# Patient Record
Sex: FEMALE | Race: BLACK OR AFRICAN AMERICAN | Employment: OTHER | URBAN - METROPOLITAN AREA
[De-identification: names, ages, dates, MRNs, and addresses within clinical notes are randomized per-mention and may not be internally consistent; named-entity substitution may affect disease eponyms.]

---

## 2021-03-18 ENCOUNTER — TRANSCRIBE ORDER (OUTPATIENT)
Dept: SCHEDULING | Age: 61
End: 2021-03-18

## 2021-03-18 DIAGNOSIS — R55 SYNCOPE AND COLLAPSE: Primary | ICD-10-CM

## 2021-04-07 ENCOUNTER — HOSPITAL ENCOUNTER (OUTPATIENT)
Dept: NON INVASIVE DIAGNOSTICS | Age: 61
Discharge: HOME | End: 2021-04-07
Payer: MEDICARE

## 2021-04-07 DIAGNOSIS — R55 SYNCOPE AND COLLAPSE: ICD-10-CM

## 2021-04-07 LAB
LEFT CCA DIST DIAS: 27.4 CM/S
LEFT CCA DIST SYS: 68.2 CM/S
LEFT CCA PROX DIAS: 24.7 CM/S
LEFT CCA PROX SYS: 115 CM/S
LEFT ECA DIAS: 12 CM/S
LEFT ECA SYS: 48.6 CM/S
LEFT ICA DIST DIAS: 23.1 CM/S
LEFT ICA DIST SYS: 66.1 CM/S
LEFT ICA PROX DIAS: 14.5 CM/S
LEFT ICA PROX SYS: 72.5 CM/S
LEFT SUBCLAVIAN DIAS: 0 CM/S
LEFT SUBCLAVIAN SYS: 83.2 CM/S
LEFT VERTEBRAL DIAS: 12.4 CM/S
LEFT VERTEBRAL SYS: 60.7 CM/S
RIGHT CCA DIST DIAS: 27.7 CM/S
RIGHT CCA DIST SYS: 55.6 CM/S
RIGHT CCA PROX DIAS: 21.5 CM/S
RIGHT CCA PROX SYS: 132 CM/S
RIGHT ECA DIAS: 7.91 CM/S
RIGHT ECA SYS: 46.1 CM/S
RIGHT ICA DIST DIAS: 18.6 CM/S
RIGHT ICA DIST SYS: 55.4 CM/S
RIGHT ICA PROX DIAS: 20.7 CM/S
RIGHT ICA PROX SYS: 41.4 CM/S
RIGHT SUBCLAVIAN DIAS: 0 CM/S
RIGHT SUBCLAVIAN SYS: 74.8 CM/S
RIGHT VERTEBRAL DIAS: 13.3 CM/S
RIGHT VERTEBRAL SYS: 73.6 CM/S

## 2021-04-07 PROCEDURE — 93880 EXTRACRANIAL BILAT STUDY: CPT

## 2023-02-03 ENCOUNTER — TRANSCRIBE ORDER (OUTPATIENT)
Dept: SCHEDULING | Age: 63
End: 2023-02-03

## 2023-02-03 DIAGNOSIS — Z12.31 ENCOUNTER FOR SCREENING MAMMOGRAM FOR MALIGNANT NEOPLASM OF BREAST: Primary | ICD-10-CM

## 2023-02-20 ENCOUNTER — HOSPITAL ENCOUNTER (OUTPATIENT)
Dept: MAMMOGRAPHY | Age: 63
Discharge: HOME | End: 2023-02-20
Payer: MEDICARE

## 2023-02-20 DIAGNOSIS — Z12.31 ENCOUNTER FOR SCREENING MAMMOGRAM FOR MALIGNANT NEOPLASM OF BREAST: ICD-10-CM

## 2023-02-20 PROCEDURE — 77063 BREAST TOMOSYNTHESIS BI: CPT

## 2023-06-26 ENCOUNTER — HOSPITAL ENCOUNTER (OUTPATIENT)
Facility: HOSPITAL | Age: 63
Discharge: HOME | End: 2023-06-29
Attending: STUDENT IN AN ORGANIZED HEALTH CARE EDUCATION/TRAINING PROGRAM
Payer: MEDICARE

## 2023-06-26 DIAGNOSIS — E04.9 GOITER: ICD-10-CM

## 2023-06-26 DIAGNOSIS — R94.6 ABNORMAL THYROID FUNCTION TEST: ICD-10-CM

## 2023-06-26 PROCEDURE — 76536 US EXAM OF HEAD AND NECK: CPT

## 2023-11-28 ENCOUNTER — OFFICE VISIT (OUTPATIENT)
Facility: CLINIC | Age: 63
End: 2023-11-28
Payer: MEDICARE

## 2023-11-28 VITALS
DIASTOLIC BLOOD PRESSURE: 68 MMHG | TEMPERATURE: 98.1 F | HEIGHT: 67 IN | SYSTOLIC BLOOD PRESSURE: 125 MMHG | RESPIRATION RATE: 18 BRPM | OXYGEN SATURATION: 96 % | WEIGHT: 187.4 LBS | HEART RATE: 91 BPM | BODY MASS INDEX: 29.41 KG/M2

## 2023-11-28 DIAGNOSIS — F32.A ANXIETY AND DEPRESSION: ICD-10-CM

## 2023-11-28 DIAGNOSIS — F41.9 ANXIETY AND DEPRESSION: ICD-10-CM

## 2023-11-28 DIAGNOSIS — J06.9 URI WITH COUGH AND CONGESTION: ICD-10-CM

## 2023-11-28 DIAGNOSIS — I10 PRIMARY HYPERTENSION: Primary | ICD-10-CM

## 2023-11-28 DIAGNOSIS — E55.9 VITAMIN D DEFICIENCY: ICD-10-CM

## 2023-11-28 DIAGNOSIS — E53.8 B12 DEFICIENCY: ICD-10-CM

## 2023-11-28 DIAGNOSIS — M05.79 RHEUMATOID ARTHRITIS INVOLVING MULTIPLE SITES WITH POSITIVE RHEUMATOID FACTOR (HCC): ICD-10-CM

## 2023-11-28 PROBLEM — G47.30 SLEEP APNEA: Status: ACTIVE | Noted: 2022-12-01

## 2023-11-28 PROBLEM — Z90.710 HISTORY OF HYSTERECTOMY: Status: ACTIVE | Noted: 2022-12-01

## 2023-11-28 PROBLEM — R63.2 BINGE EATING: Status: ACTIVE | Noted: 2022-12-22

## 2023-11-28 PROBLEM — E04.1 THYROID NODULE: Status: ACTIVE | Noted: 2023-11-28

## 2023-11-28 PROCEDURE — 3078F DIAST BP <80 MM HG: CPT

## 2023-11-28 PROCEDURE — G8419 CALC BMI OUT NRM PARAM NOF/U: HCPCS

## 2023-11-28 PROCEDURE — G8484 FLU IMMUNIZE NO ADMIN: HCPCS

## 2023-11-28 PROCEDURE — 3017F COLORECTAL CA SCREEN DOC REV: CPT

## 2023-11-28 PROCEDURE — 1036F TOBACCO NON-USER: CPT

## 2023-11-28 PROCEDURE — G8427 DOCREV CUR MEDS BY ELIG CLIN: HCPCS

## 2023-11-28 PROCEDURE — 99204 OFFICE O/P NEW MOD 45 MIN: CPT

## 2023-11-28 PROCEDURE — 3074F SYST BP LT 130 MM HG: CPT

## 2023-11-28 RX ORDER — FOLIC ACID 1 MG/1
1000 TABLET ORAL DAILY
COMMUNITY
Start: 2023-10-16

## 2023-11-28 RX ORDER — ETANERCEPT 25 MG
1 KIT SUBCUTANEOUS WEEKLY
COMMUNITY
Start: 2021-03-31

## 2023-11-28 RX ORDER — SERTRALINE HYDROCHLORIDE 100 MG/1
1 TABLET, FILM COATED ORAL DAILY
COMMUNITY
Start: 2023-10-29

## 2023-11-28 RX ORDER — AMLODIPINE BESYLATE 5 MG/1
1 TABLET ORAL DAILY
COMMUNITY

## 2023-11-28 RX ORDER — ALPRAZOLAM 0.5 MG/1
1 TABLET ORAL 2 TIMES DAILY PRN
COMMUNITY
Start: 2023-11-09

## 2023-11-28 RX ORDER — METHOTREXATE 2.5 MG/1
5 TABLET ORAL WEEKLY
COMMUNITY
Start: 2023-11-16

## 2023-11-28 RX ORDER — PHENOL 1.4 %
1 AEROSOL, SPRAY (ML) MUCOUS MEMBRANE DAILY PRN
COMMUNITY

## 2023-11-28 RX ORDER — HYDROCHLOROTHIAZIDE 12.5 MG/1
12.5 CAPSULE, GELATIN COATED ORAL DAILY
COMMUNITY
Start: 2023-10-04

## 2023-11-28 SDOH — ECONOMIC STABILITY: FOOD INSECURITY: WITHIN THE PAST 12 MONTHS, YOU WORRIED THAT YOUR FOOD WOULD RUN OUT BEFORE YOU GOT MONEY TO BUY MORE.: SOMETIMES TRUE

## 2023-11-28 SDOH — ECONOMIC STABILITY: FOOD INSECURITY: WITHIN THE PAST 12 MONTHS, THE FOOD YOU BOUGHT JUST DIDN'T LAST AND YOU DIDN'T HAVE MONEY TO GET MORE.: SOMETIMES TRUE

## 2023-11-28 SDOH — ECONOMIC STABILITY: INCOME INSECURITY: HOW HARD IS IT FOR YOU TO PAY FOR THE VERY BASICS LIKE FOOD, HOUSING, MEDICAL CARE, AND HEATING?: NOT VERY HARD

## 2023-11-28 SDOH — ECONOMIC STABILITY: HOUSING INSECURITY
IN THE LAST 12 MONTHS, WAS THERE A TIME WHEN YOU DID NOT HAVE A STEADY PLACE TO SLEEP OR SLEPT IN A SHELTER (INCLUDING NOW)?: NO

## 2023-11-28 ASSESSMENT — ENCOUNTER SYMPTOMS
COUGH: 1
SINUS PRESSURE: 0
GASTROINTESTINAL NEGATIVE: 1
TROUBLE SWALLOWING: 0
EYES NEGATIVE: 1
APNEA: 0
SORE THROAT: 0
CHEST TIGHTNESS: 0
VOICE CHANGE: 0
WHEEZING: 0
CHOKING: 0
SINUS PAIN: 0
SHORTNESS OF BREATH: 0

## 2023-11-28 ASSESSMENT — PATIENT HEALTH QUESTIONNAIRE - PHQ9
SUM OF ALL RESPONSES TO PHQ QUESTIONS 1-9: 0
2. FEELING DOWN, DEPRESSED OR HOPELESS: 0
SUM OF ALL RESPONSES TO PHQ QUESTIONS 1-9: 0
SUM OF ALL RESPONSES TO PHQ9 QUESTIONS 1 & 2: 0
1. LITTLE INTEREST OR PLEASURE IN DOING THINGS: 0
SUM OF ALL RESPONSES TO PHQ QUESTIONS 1-9: 0
SUM OF ALL RESPONSES TO PHQ QUESTIONS 1-9: 0

## 2023-11-28 NOTE — PROGRESS NOTES
Daniel Mejia is a 58 y.o. female who presents to the office today for the following:    Chief Complaint   Patient presents with    New Patient    Cough     C/O  coughing, mostly in morning and at night     Weight Management     Want to loose weight        Past Medical History:   Diagnosis Date    Hypertension     Rheumatoid arthritis ([address])     Spinal stenosis         Past Surgical History:   Procedure Laterality Date    HYSTERECTOMY (CERVIX STATUS UNKNOWN)          Family History   Problem Relation Age of Onset    High Blood Pressure Other     Diabetes Other         Social History     Tobacco Use    Smoking status: Never    Smokeless tobacco: Never   Vaping Use    Vaping Use: Never used   Substance Use Topics    Alcohol use: Never    Drug use: Never        HPI  Patient here to establish as new patient provider with [facility] hypertension, obesity, anxiety, depression, rheumatoid arthritis vitamin D deficiency. Last mammogram 2/20/2023. Hysterectomy. Had last colonoscopy at approx 5 years ago by Ebenezer Jerez with Dr. Aram Marino, [place] [facility] for thyroid nodules. Low suspicion for malignancy therefore scheduled 6/20/2024 for repeat ultrasound for close monitoring. Per last note the nodules appear to be growing and biopsy will be performed at that time. Also follows with [facility]. Patient would like to discuss weight loss and dietary options. Patient has had congested cough, postnasal drip for 1 week, worse at night however it does appear to be improving. Denies chest pain, shortness of breath, dizziness, palpitations or near syncope. Denies wheezing, fever, body aches, chills, nasal congestion, sore throat nausea vomiting or diarrhea. Patient endorses bilateral muffled hearing for several months worse on the left. Denies ear pain or drainage. Denies known injury. States that she has difficulty hearing what others are saying around her when spoken to low-frequency's.   Is

## 2023-11-29 LAB
25(OH)D3+25(OH)D2 SERPL-MCNC: 48.9 NG/ML (ref 30–100)
ALBUMIN SERPL-MCNC: 4.5 G/DL (ref 3.9–4.9)
ALBUMIN/GLOB SERPL: 1.3 {RATIO} (ref 1.2–2.2)
ALP SERPL-CCNC: 87 IU/L (ref 44–121)
ALT SERPL-CCNC: 19 IU/L (ref 0–32)
APPEARANCE UR: CLEAR
AST SERPL-CCNC: 27 IU/L (ref 0–40)
BACTERIA #/AREA URNS HPF: NORMAL /[HPF]
BASOPHILS # BLD AUTO: 0.1 X10E3/UL (ref 0–0.2)
BASOPHILS NFR BLD AUTO: 1 %
BILIRUB SERPL-MCNC: 0.6 MG/DL (ref 0–1.2)
BILIRUB UR QL STRIP: NEGATIVE
BUN SERPL-MCNC: 18 MG/DL (ref 8–27)
BUN/CREAT SERPL: 19 (ref 12–28)
CALCIUM SERPL-MCNC: 9.8 MG/DL (ref 8.7–10.3)
CASTS URNS QL MICRO: NORMAL /LPF
CHLORIDE SERPL-SCNC: 102 MMOL/L (ref 96–106)
CHOLEST SERPL-MCNC: 230 MG/DL (ref 100–199)
CO2 SERPL-SCNC: 23 MMOL/L (ref 20–29)
COLOR UR: YELLOW
CREAT SERPL-MCNC: 0.96 MG/DL (ref 0.57–1)
EGFRCR SERPLBLD CKD-EPI 2021: 66 ML/MIN/1.73
EOSINOPHIL # BLD AUTO: 0.1 X10E3/UL (ref 0–0.4)
EOSINOPHIL NFR BLD AUTO: 1 %
EPI CELLS #/AREA URNS HPF: NORMAL /HPF (ref 0–10)
ERYTHROCYTE [DISTWIDTH] IN BLOOD BY AUTOMATED COUNT: 12.3 % (ref 11.7–15.4)
FOLATE SERPL-MCNC: >20 NG/ML
GLOBULIN SER CALC-MCNC: 3.4 G/DL (ref 1.5–4.5)
GLUCOSE SERPL-MCNC: 84 MG/DL (ref 70–99)
GLUCOSE UR QL STRIP: NEGATIVE
HCT VFR BLD AUTO: 38.8 % (ref 34–46.6)
HDLC SERPL-MCNC: 109 MG/DL
HGB BLD-MCNC: 12.7 G/DL (ref 11.1–15.9)
HGB UR QL STRIP: NEGATIVE
IMM GRANULOCYTES # BLD AUTO: 0 X10E3/UL (ref 0–0.1)
IMM GRANULOCYTES NFR BLD AUTO: 0 %
KETONES UR QL STRIP: NEGATIVE
LDLC SERPL CALC-MCNC: 106 MG/DL (ref 0–99)
LEUKOCYTE ESTERASE UR QL STRIP: NEGATIVE
LYMPHOCYTES # BLD AUTO: 2 X10E3/UL (ref 0.7–3.1)
LYMPHOCYTES NFR BLD AUTO: 40 %
MCH RBC QN AUTO: 31.1 PG (ref 26.6–33)
MCHC RBC AUTO-ENTMCNC: 32.7 G/DL (ref 31.5–35.7)
MCV RBC AUTO: 95 FL (ref 79–97)
MICRO URNS: NORMAL
MICRO URNS: NORMAL
MONOCYTES # BLD AUTO: 0.5 X10E3/UL (ref 0.1–0.9)
MONOCYTES NFR BLD AUTO: 10 %
NEUTROPHILS # BLD AUTO: 2.4 X10E3/UL (ref 1.4–7)
NEUTROPHILS NFR BLD AUTO: 48 %
NITRITE UR QL STRIP: NEGATIVE
PH UR STRIP: 7 [PH] (ref 5–7.5)
PLATELET # BLD AUTO: 214 X10E3/UL (ref 150–450)
POTASSIUM SERPL-SCNC: 4.2 MMOL/L (ref 3.5–5.2)
PROT SERPL-MCNC: 7.9 G/DL (ref 6–8.5)
PROT UR QL STRIP: NEGATIVE
RBC # BLD AUTO: 4.08 X10E6/UL (ref 3.77–5.28)
RBC #/AREA URNS HPF: NORMAL /HPF (ref 0–2)
SODIUM SERPL-SCNC: 141 MMOL/L (ref 134–144)
SP GR UR STRIP: 1.01 (ref 1–1.03)
T4 FREE SERPL-MCNC: 1.11 NG/DL (ref 0.82–1.77)
TRIGL SERPL-MCNC: 89 MG/DL (ref 0–149)
TSH SERPL DL<=0.005 MIU/L-ACNC: 1.57 UIU/ML (ref 0.45–4.5)
UROBILINOGEN UR STRIP-MCNC: 0.2 MG/DL (ref 0.2–1)
VIT B12 SERPL-MCNC: 1527 PG/ML (ref 232–1245)
VLDLC SERPL CALC-MCNC: 15 MG/DL (ref 5–40)
WBC # BLD AUTO: 5 X10E3/UL (ref 3.4–10.8)
WBC #/AREA URNS HPF: NORMAL /HPF (ref 0–5)

## 2023-12-07 ENCOUNTER — NURSE ONLY (OUTPATIENT)
Facility: CLINIC | Age: 63
End: 2023-12-07
Payer: MEDICARE

## 2023-12-07 DIAGNOSIS — H91.93 BILATERAL HEARING LOSS, UNSPECIFIED HEARING LOSS TYPE: Primary | ICD-10-CM

## 2023-12-07 PROCEDURE — 69210 REMOVE IMPACTED EAR WAX UNI: CPT

## 2024-01-03 DIAGNOSIS — H91.93 BILATERAL HEARING LOSS, UNSPECIFIED HEARING LOSS TYPE: Primary | ICD-10-CM

## 2024-01-03 NOTE — PROGRESS NOTES
Patient requesting referral for bilateral hearing loss. Referral issued. Will have staff reach out to patient and notify.

## 2024-01-12 ENCOUNTER — HOSPITAL ENCOUNTER (EMERGENCY)
Facility: HOSPITAL | Age: 64
Discharge: HOME | End: 2024-01-12
Attending: EMERGENCY MEDICINE
Payer: MEDICARE

## 2024-01-12 ENCOUNTER — APPOINTMENT (OUTPATIENT)
Facility: HOSPITAL | Age: 64
End: 2024-01-12
Payer: MEDICARE

## 2024-01-12 VITALS
HEART RATE: 75 BPM | DIASTOLIC BLOOD PRESSURE: 76 MMHG | RESPIRATION RATE: 18 BRPM | OXYGEN SATURATION: 100 % | HEIGHT: 67 IN | WEIGHT: 175 LBS | BODY MASS INDEX: 27.47 KG/M2 | SYSTOLIC BLOOD PRESSURE: 123 MMHG | TEMPERATURE: 97.8 F

## 2024-01-12 DIAGNOSIS — K62.5 RECTAL BLEEDING: Primary | ICD-10-CM

## 2024-01-12 DIAGNOSIS — K64.1 GRADE II HEMORRHOIDS: ICD-10-CM

## 2024-01-12 DIAGNOSIS — R82.71 BACTERIURIA: ICD-10-CM

## 2024-01-12 DIAGNOSIS — R31.9 HEMATURIA, UNSPECIFIED TYPE: ICD-10-CM

## 2024-01-12 DIAGNOSIS — S39.012A BACK STRAIN, INITIAL ENCOUNTER: ICD-10-CM

## 2024-01-12 LAB
ALBUMIN SERPL-MCNC: 3.5 G/DL (ref 3.5–5)
ALBUMIN/GLOB SERPL: 0.8 (ref 1.1–2.2)
ALP SERPL-CCNC: 61 U/L (ref 45–117)
ALT SERPL-CCNC: 21 U/L (ref 12–78)
ANION GAP SERPL CALC-SCNC: 8 MMOL/L (ref 5–15)
APPEARANCE UR: CLEAR
AST SERPL W P-5'-P-CCNC: 21 U/L (ref 15–37)
BACTERIA URNS QL MICRO: ABNORMAL /HPF
BASOPHILS # BLD: 0 K/UL (ref 0–0.1)
BASOPHILS NFR BLD: 0 % (ref 0–1)
BILIRUB SERPL-MCNC: 0.6 MG/DL (ref 0.2–1)
BILIRUB UR QL: NEGATIVE
BUN SERPL-MCNC: 15 MG/DL (ref 6–20)
BUN/CREAT SERPL: 15 (ref 12–20)
CA-I BLD-MCNC: 9.1 MG/DL (ref 8.5–10.1)
CHLORIDE SERPL-SCNC: 105 MMOL/L (ref 97–108)
CO2 SERPL-SCNC: 28 MMOL/L (ref 21–32)
COLOR UR: ABNORMAL
CREAT SERPL-MCNC: 1.02 MG/DL (ref 0.55–1.02)
DIFFERENTIAL METHOD BLD: ABNORMAL
EOSINOPHIL # BLD: 0 K/UL (ref 0–0.4)
EOSINOPHIL NFR BLD: 1 % (ref 0–7)
ERYTHROCYTE [DISTWIDTH] IN BLOOD BY AUTOMATED COUNT: 12.3 % (ref 11.5–14.5)
GLOBULIN SER CALC-MCNC: 4.3 G/DL (ref 2–4)
GLUCOSE SERPL-MCNC: 92 MG/DL (ref 65–100)
GLUCOSE UR STRIP.AUTO-MCNC: NEGATIVE MG/DL
HCT VFR BLD AUTO: 35.1 % (ref 35–47)
HEMOCCULT SP1 STL QL: NEGATIVE
HGB BLD-MCNC: 11.6 G/DL (ref 11.5–16)
HGB UR QL STRIP: NEGATIVE
IMM GRANULOCYTES # BLD AUTO: 0 K/UL
IMM GRANULOCYTES NFR BLD AUTO: 0 %
INR PPP: 1 (ref 0.9–1.1)
KETONES UR QL STRIP.AUTO: NEGATIVE MG/DL
LEUKOCYTE ESTERASE UR QL STRIP.AUTO: ABNORMAL
LYMPHOCYTES # BLD: 1.9 K/UL (ref 0.8–3.5)
LYMPHOCYTES NFR BLD: 53 % (ref 12–49)
MCH RBC QN AUTO: 31.4 PG (ref 26–34)
MCHC RBC AUTO-ENTMCNC: 33 G/DL (ref 30–36.5)
MCV RBC AUTO: 95.1 FL (ref 80–99)
MONOCYTES # BLD: 0.4 K/UL (ref 0–1)
MONOCYTES NFR BLD: 11 % (ref 5–13)
NEUTS SEG # BLD: 1.3 K/UL (ref 1.8–8)
NEUTS SEG NFR BLD: 35 % (ref 32–75)
NITRITE UR QL STRIP.AUTO: NEGATIVE
NRBC # BLD: 0 K/UL (ref 0–0.01)
NRBC BLD-RTO: 0 PER 100 WBC
PH UR STRIP: 7.5 (ref 5–8)
PLATELET # BLD AUTO: 145 K/UL (ref 150–400)
PMV BLD AUTO: 9.9 FL (ref 8.9–12.9)
POTASSIUM SERPL-SCNC: 3.4 MMOL/L (ref 3.5–5.1)
PROT SERPL-MCNC: 7.8 G/DL (ref 6.4–8.2)
PROT UR STRIP-MCNC: NEGATIVE MG/DL
PROTHROMBIN TIME: 13.1 SEC (ref 11.9–14.6)
RBC # BLD AUTO: 3.69 M/UL (ref 3.8–5.2)
RBC #/AREA URNS HPF: ABNORMAL /HPF (ref 0–3)
RBC MORPH BLD: ABNORMAL
SODIUM SERPL-SCNC: 141 MMOL/L (ref 136–145)
SP GR UR REFRACTOMETRY: 1.01 (ref 1–1.03)
UROBILINOGEN UR QL STRIP.AUTO: 0.2 EU/DL (ref 0.2–1)
WBC # BLD AUTO: 3.6 K/UL (ref 3.6–11)
WBC URNS QL MICRO: ABNORMAL /HPF (ref 0–5)

## 2024-01-12 PROCEDURE — 96374 THER/PROPH/DIAG INJ IV PUSH: CPT

## 2024-01-12 PROCEDURE — 82272 OCCULT BLD FECES 1-3 TESTS: CPT

## 2024-01-12 PROCEDURE — 74177 CT ABD & PELVIS W/CONTRAST: CPT

## 2024-01-12 PROCEDURE — 6360000002 HC RX W HCPCS: Performed by: EMERGENCY MEDICINE

## 2024-01-12 PROCEDURE — 85610 PROTHROMBIN TIME: CPT

## 2024-01-12 PROCEDURE — 36415 COLL VENOUS BLD VENIPUNCTURE: CPT

## 2024-01-12 PROCEDURE — 99285 EMERGENCY DEPT VISIT HI MDM: CPT

## 2024-01-12 PROCEDURE — 80053 COMPREHEN METABOLIC PANEL: CPT

## 2024-01-12 PROCEDURE — 81001 URINALYSIS AUTO W/SCOPE: CPT

## 2024-01-12 PROCEDURE — 85025 COMPLETE CBC W/AUTO DIFF WBC: CPT

## 2024-01-12 PROCEDURE — 6360000004 HC RX CONTRAST MEDICATION: Performed by: EMERGENCY MEDICINE

## 2024-01-12 RX ORDER — CYCLOBENZAPRINE HCL 5 MG
5-10 TABLET ORAL 3 TIMES DAILY PRN
Qty: 21 TABLET | Refills: 0 | Status: SHIPPED | OUTPATIENT
Start: 2024-01-12 | End: 2024-01-19

## 2024-01-12 RX ORDER — OMEPRAZOLE 20 MG/1
40 CAPSULE, DELAYED RELEASE ORAL DAILY
Qty: 60 CAPSULE | Refills: 0 | Status: SHIPPED | OUTPATIENT
Start: 2024-01-12 | End: 2024-02-11

## 2024-01-12 RX ORDER — ONDANSETRON 4 MG/1
4 TABLET, ORALLY DISINTEGRATING ORAL EVERY 8 HOURS PRN
Qty: 20 TABLET | Refills: 0 | Status: SHIPPED | OUTPATIENT
Start: 2024-01-12 | End: 2024-01-19

## 2024-01-12 RX ORDER — IBUPROFEN 600 MG/1
600 TABLET ORAL EVERY 6 HOURS PRN
Qty: 28 TABLET | Refills: 0 | Status: SHIPPED | OUTPATIENT
Start: 2024-01-12 | End: 2024-01-19

## 2024-01-12 RX ORDER — CEPHALEXIN 500 MG/1
500 CAPSULE ORAL 4 TIMES DAILY
Qty: 28 CAPSULE | Refills: 0 | Status: SHIPPED | OUTPATIENT
Start: 2024-01-12 | End: 2024-01-19

## 2024-01-12 RX ORDER — ACETAMINOPHEN 500 MG
500 TABLET ORAL EVERY 6 HOURS PRN
Qty: 28 TABLET | Refills: 0 | Status: SHIPPED | OUTPATIENT
Start: 2024-01-12 | End: 2024-01-19

## 2024-01-12 RX ORDER — MORPHINE SULFATE 4 MG/ML
4 INJECTION, SOLUTION INTRAMUSCULAR; INTRAVENOUS
Status: COMPLETED | OUTPATIENT
Start: 2024-01-12 | End: 2024-01-12

## 2024-01-12 RX ORDER — LIDOCAINE 50 MG/G
1 PATCH TOPICAL DAILY
Qty: 10 PATCH | Refills: 0 | Status: SHIPPED | OUTPATIENT
Start: 2024-01-12 | End: 2024-01-22

## 2024-01-12 RX ADMIN — IOPAMIDOL 100 ML: 755 INJECTION, SOLUTION INTRAVENOUS at 11:18

## 2024-01-12 RX ADMIN — MORPHINE SULFATE 4 MG: 4 INJECTION, SOLUTION INTRAMUSCULAR; INTRAVENOUS at 09:31

## 2024-01-12 ASSESSMENT — PAIN DESCRIPTION - LOCATION: LOCATION: BACK

## 2024-01-12 ASSESSMENT — PAIN DESCRIPTION - ORIENTATION: ORIENTATION: LOWER

## 2024-01-12 ASSESSMENT — PAIN DESCRIPTION - PAIN TYPE: TYPE: ACUTE PAIN

## 2024-01-12 ASSESSMENT — PAIN SCALES - GENERAL: PAINLEVEL_OUTOF10: 6

## 2024-01-12 NOTE — ED TRIAGE NOTES
Pt reports hematuria and moderate amount of bright red blood in her stool since Sunday. PT reports low back pain 8/10. Pt denies hx of GI bleed.

## 2024-01-12 NOTE — ED PROVIDER NOTES
calculato...       Collection Time Result Time CALCIUM BILI TOTAL AST ALT Alk Phosphatase   01/12/24 09:03:00 01/12/24 09:36:23 9.1 0.6 21 21 61       Collection Time Result Time PROTEIN ALBUMIN Globulin Albumin/Globulin Ratio   01/12/24 09:03:00 01/12/24 09:36:23 7.8 3.5 4.3 High  0.8 Low          Final result                          Protime-INR (Final result)   Component (Lab Inquiry)  Collection Time Result Time Protime INR   01/12/24 09:03:00 01/12/24 09:33:45 13.1 1.0         Final result              Radiologic Studies:  Non-plain film images such as CT, Ultrasound and MRI are read by the radiologist. Plain radiographic images are visualized and preliminarily interpreted by the ED Provider with the following findings: Not Applicable.    Interpretation per the Radiologist below, if available at the time of this note:  CT ABDOMEN PELVIS W IV CONTRAST Additional Contrast? Radiologist Recommendation   Final Result      1. No acute abnormality.           EMERGENCY DEPARTMENT COURSE and DIFFERENTIAL DIAGNOSIS/MDM   CC/HPI Summary, DDx, ED Course, and Reassessment: pt arrives to ed with concerns for vaginal or rectal bleeding. No evidnce of blood on exam. Began complaining of lower abdominal and lower back pain. Follow up with GI recommended. Hgb stable, return precautions outlined.        Records Reviewed (source and summary of external notes): Prior medical records and Nursing notes    Vitals:    Vitals:    01/12/24 0859   BP: 123/76   Pulse: 75   Resp: 18   Temp: 97.8 °F (36.6 °C)   SpO2: 100%   Weight: 79.4 kg (175 lb)   Height: 1.702 m (5' 7\")        ED COURSE  ED Course as of 01/14/24 0913 Fri Jan 12, 2024   1012 Scant blood in rectal vault, heme negative.  [MC]      ED Course User Index  [MC] Zaheer Otero MD        Patient was given the following medications:  Medications   morphine sulfate (PF) injection 4 mg (4 mg IntraVENous Given 1/12/24 0931)   iopamidol (ISOVUE-370) 76 % injection 100 mL

## 2024-01-17 ENCOUNTER — OFFICE VISIT (OUTPATIENT)
Facility: CLINIC | Age: 64
End: 2024-01-17
Payer: MEDICARE

## 2024-01-17 VITALS
RESPIRATION RATE: 18 BRPM | WEIGHT: 174.25 LBS | HEIGHT: 67 IN | OXYGEN SATURATION: 98 % | TEMPERATURE: 97.4 F | SYSTOLIC BLOOD PRESSURE: 95 MMHG | DIASTOLIC BLOOD PRESSURE: 59 MMHG | BODY MASS INDEX: 27.35 KG/M2 | HEART RATE: 90 BPM

## 2024-01-17 DIAGNOSIS — H91.93 BILATERAL HEARING LOSS, UNSPECIFIED HEARING LOSS TYPE: Primary | ICD-10-CM

## 2024-01-17 DIAGNOSIS — N30.01 ACUTE CYSTITIS WITH HEMATURIA: ICD-10-CM

## 2024-01-17 DIAGNOSIS — K62.5 RECTAL BLEEDING: ICD-10-CM

## 2024-01-17 DIAGNOSIS — I95.9 HYPOTENSION, UNSPECIFIED HYPOTENSION TYPE: ICD-10-CM

## 2024-01-17 PROCEDURE — 3017F COLORECTAL CA SCREEN DOC REV: CPT

## 2024-01-17 PROCEDURE — G8419 CALC BMI OUT NRM PARAM NOF/U: HCPCS

## 2024-01-17 PROCEDURE — G8484 FLU IMMUNIZE NO ADMIN: HCPCS

## 2024-01-17 PROCEDURE — 1036F TOBACCO NON-USER: CPT

## 2024-01-17 PROCEDURE — G8427 DOCREV CUR MEDS BY ELIG CLIN: HCPCS

## 2024-01-17 PROCEDURE — 99214 OFFICE O/P EST MOD 30 MIN: CPT

## 2024-01-17 RX ORDER — NITROFURANTOIN 25; 75 MG/1; MG/1
100 CAPSULE ORAL 2 TIMES DAILY
Qty: 10 CAPSULE | Refills: 0 | Status: SHIPPED | OUTPATIENT
Start: 2024-01-17 | End: 2024-01-22

## 2024-01-17 ASSESSMENT — PATIENT HEALTH QUESTIONNAIRE - PHQ9
SUM OF ALL RESPONSES TO PHQ9 QUESTIONS 1 & 2: 0
SUM OF ALL RESPONSES TO PHQ QUESTIONS 1-9: 0
1. LITTLE INTEREST OR PLEASURE IN DOING THINGS: 0
SUM OF ALL RESPONSES TO PHQ QUESTIONS 1-9: 0
SUM OF ALL RESPONSES TO PHQ QUESTIONS 1-9: 0
2. FEELING DOWN, DEPRESSED OR HOPELESS: 0
SUM OF ALL RESPONSES TO PHQ QUESTIONS 1-9: 0

## 2024-01-17 NOTE — PROGRESS NOTES
Chief Complaint   Patient presents with    Follow-up     F/U ED visit for rectal bleeding on 01/12/2024 and back pain.   was given medication for things that she did not know the reason for so she did not take all of the meds Rx'd for her.    Rectal Bleeding     States continues with rectal bleeding, bright red.  States had problem before and it stopped after a day or 2.  For the past 2 weeks, she continues with the bleeding.    Dizziness     States feels dizzy at times.     1. Have you been to the ER, urgent care clinic since your last visit?  Hospitalized since your last visit?Yes When: 01/12/2024 [facility] ED.    2. Have you seen or consulted any other health care providers outside of the [facility] since your last visit?  Include any pap smears or colon screening. No    
Audiology  New referral sent to audiology.      2. Rectal bleeding  -     CBC with Auto Differential  -     Comprehensive Metabolic Panel  -     [facility] -  Trent Delgadillo Jr., MD, Gastroenterology, [facility] in ER negative.  Denies rectal bleeding over the past 2 days.  Will check labs now for stability.  Referral sent to Dr. Delgadillo.  Contact for self scheduling provided.    3.  Hypotension, unspecified hypotension type  BP is borderline at 95/59 and 85/55 in office today.  Patient denies chest pain shortness of breath dizziness palpitations or near syncope today however has reported some dizziness over the past week.  Labs stable in ER however we will go ahead and repeat now.  Discussed with patient would like her to hold HCTZ 12.5 mg daily until after return visit next week.  Avoid driving or operating machinery and presence of dizziness to reduce risk of injury to self or others.  Slow position changes.  Address hypertension can increase risk for falls.  1 week follow-up in office.    4. Acute cystitis with hematuria  -     nitrofurantoin, macrocrystal-monohydrate, (MACROBID) 100 MG capsule; Take 1 capsule by mouth 2 times daily for 5 days, Disp-10 capsule, R-0Normal  -     Urinalysis with Microscopic  -     Culture, Urine  UA positive in ER for bacteria and leukocytes.  Due to persistent low back pain and dysuria will treat for UTI now.  Treat with Macrobid twice daily for 5 days.  Take as directed.  Will send culture now.  May also take Azo as directed for up to 24 hours.  Increase water intake.  Tylenol as needed for pain as directed.  Contact provider if no improvement in 2 to 3 days or worsening symptoms.    We discussed the expected course, resolution and complications of the diagnosis(es) in detail.  Medication risks, benefits, costs, interactions, and alternatives were discussed as indicated.  I advised her to contact the office if her condition worsens, changes or fails to improve as

## 2024-01-18 LAB
ALBUMIN SERPL-MCNC: 4.5 G/DL (ref 3.9–4.9)
ALBUMIN/GLOB SERPL: 1.5 {RATIO} (ref 1.2–2.2)
ALP SERPL-CCNC: 67 IU/L (ref 44–121)
ALT SERPL-CCNC: 15 IU/L (ref 0–32)
APPEARANCE UR: CLEAR
AST SERPL-CCNC: 26 IU/L (ref 0–40)
BACTERIA #/AREA URNS HPF: NORMAL /[HPF]
BASOPHILS # BLD AUTO: 0 X10E3/UL (ref 0–0.2)
BASOPHILS NFR BLD AUTO: 1 %
BILIRUB SERPL-MCNC: 0.7 MG/DL (ref 0–1.2)
BILIRUB UR QL STRIP: NEGATIVE
BUN SERPL-MCNC: 16 MG/DL (ref 8–27)
BUN/CREAT SERPL: 19 (ref 12–28)
CALCIUM SERPL-MCNC: 9.9 MG/DL (ref 8.7–10.3)
CASTS URNS QL MICRO: NORMAL /LPF
CHLORIDE SERPL-SCNC: 103 MMOL/L (ref 96–106)
CO2 SERPL-SCNC: 25 MMOL/L (ref 20–29)
COLOR UR: YELLOW
CREAT SERPL-MCNC: 0.86 MG/DL (ref 0.57–1)
EGFRCR SERPLBLD CKD-EPI 2021: 75 ML/MIN/1.73
EOSINOPHIL # BLD AUTO: 0 X10E3/UL (ref 0–0.4)
EOSINOPHIL NFR BLD AUTO: 1 %
EPI CELLS #/AREA URNS HPF: NORMAL /HPF (ref 0–10)
ERYTHROCYTE [DISTWIDTH] IN BLOOD BY AUTOMATED COUNT: 12.7 % (ref 11.7–15.4)
GLOBULIN SER CALC-MCNC: 3 G/DL (ref 1.5–4.5)
GLUCOSE SERPL-MCNC: 86 MG/DL (ref 70–99)
GLUCOSE UR QL STRIP: NEGATIVE
HCT VFR BLD AUTO: 34 % (ref 34–46.6)
HGB BLD-MCNC: 11.2 G/DL (ref 11.1–15.9)
HGB UR QL STRIP: NEGATIVE
IMM GRANULOCYTES # BLD AUTO: 0 X10E3/UL (ref 0–0.1)
IMM GRANULOCYTES NFR BLD AUTO: 0 %
KETONES UR QL STRIP: NEGATIVE
LEUKOCYTE ESTERASE UR QL STRIP: NEGATIVE
LYMPHOCYTES # BLD AUTO: 2.3 X10E3/UL (ref 0.7–3.1)
LYMPHOCYTES NFR BLD AUTO: 46 %
MCH RBC QN AUTO: 31 PG (ref 26.6–33)
MCHC RBC AUTO-ENTMCNC: 32.9 G/DL (ref 31.5–35.7)
MCV RBC AUTO: 94 FL (ref 79–97)
MICRO URNS: NORMAL
MICRO URNS: NORMAL
MONOCYTES # BLD AUTO: 0.4 X10E3/UL (ref 0.1–0.9)
MONOCYTES NFR BLD AUTO: 8 %
NEUTROPHILS # BLD AUTO: 2.3 X10E3/UL (ref 1.4–7)
NEUTROPHILS NFR BLD AUTO: 44 %
NITRITE UR QL STRIP: NEGATIVE
PH UR STRIP: 6 [PH] (ref 5–7.5)
PLATELET # BLD AUTO: 226 X10E3/UL (ref 150–450)
POTASSIUM SERPL-SCNC: 3.9 MMOL/L (ref 3.5–5.2)
PROT SERPL-MCNC: 7.5 G/DL (ref 6–8.5)
PROT UR QL STRIP: NEGATIVE
RBC # BLD AUTO: 3.61 X10E6/UL (ref 3.77–5.28)
RBC #/AREA URNS HPF: NORMAL /HPF (ref 0–2)
SODIUM SERPL-SCNC: 139 MMOL/L (ref 134–144)
SP GR UR STRIP: 1 (ref 1–1.03)
UROBILINOGEN UR STRIP-MCNC: 0.2 MG/DL (ref 0.2–1)
WBC # BLD AUTO: 5.1 X10E3/UL (ref 3.4–10.8)
WBC #/AREA URNS HPF: NORMAL /HPF (ref 0–5)

## 2024-01-19 LAB — BACTERIA UR CULT: NO GROWTH

## 2024-01-22 ENCOUNTER — TELEPHONE (OUTPATIENT)
Facility: CLINIC | Age: 64
End: 2024-01-22

## 2024-01-22 NOTE — TELEPHONE ENCOUNTER
Spoke with patient re: lab results and her requesting another GI referral which was forwarded to KENNETH Baldwin NP.

## 2024-01-23 ENCOUNTER — OFFICE VISIT (OUTPATIENT)
Facility: CLINIC | Age: 64
End: 2024-01-23
Payer: MEDICARE

## 2024-01-23 VITALS
OXYGEN SATURATION: 100 % | RESPIRATION RATE: 18 BRPM | BODY MASS INDEX: 27.86 KG/M2 | SYSTOLIC BLOOD PRESSURE: 94 MMHG | TEMPERATURE: 97.4 F | HEIGHT: 67 IN | DIASTOLIC BLOOD PRESSURE: 52 MMHG | HEART RATE: 84 BPM | WEIGHT: 177.5 LBS

## 2024-01-23 DIAGNOSIS — K62.5 RECTAL BLEEDING: Primary | ICD-10-CM

## 2024-01-23 DIAGNOSIS — I95.9 HYPOTENSION, UNSPECIFIED HYPOTENSION TYPE: ICD-10-CM

## 2024-01-23 PROCEDURE — 99213 OFFICE O/P EST LOW 20 MIN: CPT

## 2024-01-23 PROCEDURE — G8484 FLU IMMUNIZE NO ADMIN: HCPCS

## 2024-01-23 PROCEDURE — G8427 DOCREV CUR MEDS BY ELIG CLIN: HCPCS

## 2024-01-23 PROCEDURE — G8419 CALC BMI OUT NRM PARAM NOF/U: HCPCS

## 2024-01-23 PROCEDURE — 3017F COLORECTAL CA SCREEN DOC REV: CPT

## 2024-01-23 PROCEDURE — 1036F TOBACCO NON-USER: CPT

## 2024-01-23 ASSESSMENT — PATIENT HEALTH QUESTIONNAIRE - PHQ9
SUM OF ALL RESPONSES TO PHQ QUESTIONS 1-9: 1
SUM OF ALL RESPONSES TO PHQ QUESTIONS 1-9: 1
1. LITTLE INTEREST OR PLEASURE IN DOING THINGS: 0
2. FEELING DOWN, DEPRESSED OR HOPELESS: 1
SUM OF ALL RESPONSES TO PHQ QUESTIONS 1-9: 1
SUM OF ALL RESPONSES TO PHQ QUESTIONS 1-9: 1
SUM OF ALL RESPONSES TO PHQ9 QUESTIONS 1 & 2: 1

## 2024-01-23 ASSESSMENT — ENCOUNTER SYMPTOMS
GASTROINTESTINAL NEGATIVE: 1
RESPIRATORY NEGATIVE: 1
EYES NEGATIVE: 1

## 2024-01-23 NOTE — PROGRESS NOTES
Acacia Jarquin is a 63 y.o. female who presents to the office today for the following:    Chief Complaint   Patient presents with    Follow-up     Hypotension.    Rectal Bleeding     Patient states she has not had any rectal bleeding for the past 2-3 days.  The last time it was just a little bit per patient.       Past Medical History:   Diagnosis Date    Hypertension     Rheumatoid arthritis (HCC)     Spinal stenosis         Past Surgical History:   Procedure Laterality Date    HYSTERECTOMY (CERVIX STATUS UNKNOWN)          Family History   Problem Relation Age of Onset    High Blood Pressure Other     Diabetes Other         Social History     Tobacco Use    Smoking status: Never    Smokeless tobacco: Never   Vaping Use    Vaping Use: Never used   Substance Use Topics    Alcohol use: Never    Drug use: Never        HPI  Patient here ***    Chief Complaint   Patient presents with    Follow-up     Hypotension.    Rectal Bleeding     Patient states she has not had any rectal bleeding for the past 2-3 days.  The last time it was just a little bit per patient.       Current Outpatient Medications on File Prior to Visit   Medication Sig Dispense Refill    sertraline (ZOLOFT) 100 MG tablet Take 1 tablet by mouth daily      methotrexate (RHEUMATREX) 2.5 MG chemo tablet Take 5 tablets by mouth Once a week at 5 PM      hydroCHLOROthiazide (MICROZIDE) 12.5 MG capsule Take 1 capsule by mouth daily      folic acid (FOLVITE) 1 MG tablet Take 1 tablet by mouth daily      etanercept (ENBREL) 25 MG injection Inject 25 mg into the skin Once a week at 5 PM      vitamin D (CHOLECALCIFEROL) 50 MCG (2000 UT) CAPS capsule Take 1 capsule by mouth daily      amLODIPine (NORVASC) 5 MG tablet Take 1 tablet by mouth daily      ALPRAZolam (XANAX) 0.5 MG tablet Take 1 tablet by mouth 2 times daily as needed.      calcium carbonate 600 MG TABS tablet Take 1 tablet by mouth daily as needed       No current facility-administered medications on file 
Chief Complaint   Patient presents with    Follow-up     Hypotension.    Rectal Bleeding     Patient states she has not had any rectal bleeding for the past 2-3 days.  The last time it was just a little bit per patient.     1. Have you been to the ER, urgent care clinic since your last visit?  Hospitalized since your last visit?No    2. Have you seen or consulted any other health care providers outside of the [facility] since your last visit?  Include any pap smears or colon screening. No    
File Prior to Visit   Medication Sig Dispense Refill    sertraline (ZOLOFT) 100 MG tablet Take 1 tablet by mouth daily      methotrexate (RHEUMATREX) 2.5 MG chemo tablet Take 5 tablets by mouth Once a week at 5 PM      hydroCHLOROthiazide (MICROZIDE) 12.5 MG capsule Take 1 capsule by mouth daily      folic acid (FOLVITE) 1 MG tablet Take 1 tablet by mouth daily      etanercept (ENBREL) 25 MG injection Inject 25 mg into the skin Once a week at 5 PM      vitamin D (CHOLECALCIFEROL) 50 MCG (2000 UT) CAPS capsule Take 1 capsule by mouth daily      amLODIPine (NORVASC) 5 MG tablet Take 1 tablet by mouth daily      ALPRAZolam (XANAX) 0.5 MG tablet Take 1 tablet by mouth 2 times daily as needed.      calcium carbonate 600 MG TABS tablet Take 1 tablet by mouth daily as needed       No current facility-administered medications on file prior to visit.        Current Outpatient Medications   Medication Sig Dispense Refill    sertraline (ZOLOFT) 100 MG tablet Take 1 tablet by mouth daily      methotrexate (RHEUMATREX) 2.5 MG chemo tablet Take 5 tablets by mouth Once a week at 5 PM      hydroCHLOROthiazide (MICROZIDE) 12.5 MG capsule Take 1 capsule by mouth daily      folic acid (FOLVITE) 1 MG tablet Take 1 tablet by mouth daily      etanercept (ENBREL) 25 MG injection Inject 25 mg into the skin Once a week at 5 PM      vitamin D (CHOLECALCIFEROL) 50 MCG (2000 UT) CAPS capsule Take 1 capsule by mouth daily      amLODIPine (NORVASC) 5 MG tablet Take 1 tablet by mouth daily      ALPRAZolam (XANAX) 0.5 MG tablet Take 1 tablet by mouth 2 times daily as needed.      calcium carbonate 600 MG TABS tablet Take 1 tablet by mouth daily as needed       No current facility-administered medications for this visit.          Review of Systems   Constitutional: Negative.    HENT: Negative.     Eyes: Negative.    Respiratory: Negative.     Cardiovascular: Negative.    Gastrointestinal: Negative.    Genitourinary: Negative.    Musculoskeletal:

## 2024-01-29 ASSESSMENT — ENCOUNTER SYMPTOMS
BACK PAIN: 1
EYES NEGATIVE: 1
RESPIRATORY NEGATIVE: 1
GASTROINTESTINAL NEGATIVE: 1

## 2024-02-29 ENCOUNTER — OFFICE VISIT (OUTPATIENT)
Facility: CLINIC | Age: 64
End: 2024-02-29
Payer: MEDICARE

## 2024-02-29 VITALS
HEIGHT: 67 IN | RESPIRATION RATE: 20 BRPM | HEART RATE: 82 BPM | DIASTOLIC BLOOD PRESSURE: 64 MMHG | OXYGEN SATURATION: 100 % | WEIGHT: 176.25 LBS | BODY MASS INDEX: 27.66 KG/M2 | SYSTOLIC BLOOD PRESSURE: 114 MMHG | TEMPERATURE: 97.5 F

## 2024-02-29 DIAGNOSIS — F41.9 ANXIETY AND DEPRESSION: ICD-10-CM

## 2024-02-29 DIAGNOSIS — M05.79 RHEUMATOID ARTHRITIS INVOLVING MULTIPLE SITES WITH POSITIVE RHEUMATOID FACTOR (HCC): ICD-10-CM

## 2024-02-29 DIAGNOSIS — E66.3 OVERWEIGHT (BMI 25.0-29.9): ICD-10-CM

## 2024-02-29 DIAGNOSIS — F32.A ANXIETY AND DEPRESSION: ICD-10-CM

## 2024-02-29 DIAGNOSIS — I10 PRIMARY HYPERTENSION: ICD-10-CM

## 2024-02-29 DIAGNOSIS — K62.5 RECTAL BLEEDING: ICD-10-CM

## 2024-02-29 DIAGNOSIS — Z12.31 BREAST CANCER SCREENING BY MAMMOGRAM: Primary | ICD-10-CM

## 2024-02-29 PROCEDURE — G8484 FLU IMMUNIZE NO ADMIN: HCPCS

## 2024-02-29 PROCEDURE — G8427 DOCREV CUR MEDS BY ELIG CLIN: HCPCS

## 2024-02-29 PROCEDURE — 1123F ACP DISCUSS/DSCN MKR DOCD: CPT

## 2024-02-29 PROCEDURE — 3017F COLORECTAL CA SCREEN DOC REV: CPT

## 2024-02-29 PROCEDURE — 3074F SYST BP LT 130 MM HG: CPT

## 2024-02-29 PROCEDURE — 99214 OFFICE O/P EST MOD 30 MIN: CPT

## 2024-02-29 PROCEDURE — G8400 PT W/DXA NO RESULTS DOC: HCPCS

## 2024-02-29 PROCEDURE — 1036F TOBACCO NON-USER: CPT

## 2024-02-29 PROCEDURE — G8419 CALC BMI OUT NRM PARAM NOF/U: HCPCS

## 2024-02-29 PROCEDURE — 1090F PRES/ABSN URINE INCON ASSESS: CPT

## 2024-02-29 PROCEDURE — 3078F DIAST BP <80 MM HG: CPT

## 2024-02-29 RX ORDER — TOPIRAMATE 50 MG/1
50 TABLET, FILM COATED ORAL 2 TIMES DAILY
COMMUNITY
Start: 2024-01-30

## 2024-02-29 RX ORDER — IBUPROFEN 600 MG/1
600 TABLET ORAL
COMMUNITY
Start: 2024-02-13 | End: 2024-02-29 | Stop reason: ALTCHOICE

## 2024-02-29 ASSESSMENT — ENCOUNTER SYMPTOMS
EYES NEGATIVE: 1
TROUBLE SWALLOWING: 0
CHOKING: 0
APNEA: 0
SORE THROAT: 0
SINUS PAIN: 0
GASTROINTESTINAL NEGATIVE: 1
SINUS PRESSURE: 0
CHEST TIGHTNESS: 0
SHORTNESS OF BREATH: 0
WHEEZING: 0
VOICE CHANGE: 0

## 2024-02-29 ASSESSMENT — PATIENT HEALTH QUESTIONNAIRE - PHQ9
2. FEELING DOWN, DEPRESSED OR HOPELESS: 0
SUM OF ALL RESPONSES TO PHQ9 QUESTIONS 1 & 2: 0
SUM OF ALL RESPONSES TO PHQ QUESTIONS 1-9: 0
1. LITTLE INTEREST OR PLEASURE IN DOING THINGS: 0
SUM OF ALL RESPONSES TO PHQ QUESTIONS 1-9: 0
SUM OF ALL RESPONSES TO PHQ QUESTIONS 1-9: 0

## 2024-02-29 NOTE — PROGRESS NOTES
Acacia Jarquin is a 64 y.o. female who presents to the office today for the following:    Chief Complaint   Patient presents with    Follow-up     3 month follow up.  States is doing alright.    Weight Management     Wants to know why it takes her so long to lose weight.  Has lost 10lbs over 3 month period.       Past Medical History:   Diagnosis Date    Hypertension     Rheumatoid arthritis (HCC)     Spinal stenosis         Past Surgical History:   Procedure Laterality Date    HYSTERECTOMY (CERVIX STATUS UNKNOWN)          Family History   Problem Relation Age of Onset    High Blood Pressure Other     Diabetes Other         Social History     Tobacco Use    Smoking status: Never    Smokeless tobacco: Never   Vaping Use    Vaping Use: Never used   Substance Use Topics    Alcohol use: Never    Drug use: Never        HPI  Patient here for follow-up of chronic conditions with [facility] hypertension, hyperlipidemia, obesity, anxiety, depression, bilateral SNHL, rheumatoid arthritis vitamin D deficiency.  Last mammogram 2/20/2023. Hysterectomy. Had last colonoscopy at approx 5 years ago by Elie.  She is scheduled for next follow-up 3/21/2024. follows with Dr. Ramirez, [place] [facility] for thyroid nodules.  Low suspicion for malignancy therefore scheduled 6/20/2024 for repeat ultrasound for close monitoring.  Per last note the nodules appear to be growing and biopsy will be performed at that time. Also follows with [facility].     Patient would like to discuss weight loss and dietary options.  She is also lost 10 pounds in 3 months and would like to know why it is taken so long for her to lose weight.      While here patient states that she is scheduled for hearing aid consultation after diagnosis with bilateral sensorineural hearing loss.      States ENT   Chief Complaint   Patient presents with    Follow-up     3 month follow up.  States is doing alright.    Weight Management     Wants to know why it takes

## 2024-02-29 NOTE — PROGRESS NOTES
Chief Complaint   Patient presents with    Follow-up     3 month follow up.  States is doing alright but stays tired.    Fatigue    Weight Management     Wants to know why it takes her so long to lose weight.  Has lost 10lbs over 3 month period.     1. Have you been to the ER, urgent care clinic since your last visit?  Hospitalized since your last visit?No    2. Have you seen or consulted any other health care providers outside of the [facility] since your last visit?  Include any pap smears or colon screening. No

## 2024-03-07 ENCOUNTER — HOSPITAL ENCOUNTER (OUTPATIENT)
Facility: HOSPITAL | Age: 64
Discharge: HOME | End: 2024-03-07
Payer: MEDICARE

## 2024-03-07 ENCOUNTER — TELEPHONE (OUTPATIENT)
Facility: CLINIC | Age: 64
End: 2024-03-07

## 2024-03-07 VITALS — BODY MASS INDEX: 27.62 KG/M2 | WEIGHT: 176 LBS | HEIGHT: 67 IN

## 2024-03-07 DIAGNOSIS — Z12.31 BREAST CANCER SCREENING BY MAMMOGRAM: ICD-10-CM

## 2024-03-07 PROCEDURE — 77063 BREAST TOMOSYNTHESIS BI: CPT

## 2024-03-19 DIAGNOSIS — F41.9 ANXIETY: ICD-10-CM

## 2024-03-19 DIAGNOSIS — K62.5 RECTAL BLEEDING: Primary | ICD-10-CM

## 2024-05-13 ENCOUNTER — OFFICE VISIT (OUTPATIENT)
Age: 64
End: 2024-05-13

## 2024-05-13 VITALS
SYSTOLIC BLOOD PRESSURE: 134 MMHG | RESPIRATION RATE: 18 BRPM | OXYGEN SATURATION: 97 % | HEART RATE: 77 BPM | DIASTOLIC BLOOD PRESSURE: 80 MMHG | WEIGHT: 169.8 LBS | HEIGHT: 67 IN | TEMPERATURE: 98.1 F | BODY MASS INDEX: 26.65 KG/M2

## 2024-05-13 DIAGNOSIS — K62.5 RECTAL BLEEDING: Primary | ICD-10-CM

## 2024-05-13 RX ORDER — SODIUM PICOSULFATE, MAGNESIUM OXIDE, AND ANHYDROUS CITRIC ACID 10; 3.5; 12 MG/160ML; G/160ML; G/160ML
1 LIQUID ORAL EVERY 6 HOURS
Qty: 320 ML | Refills: 0 | Status: SHIPPED | OUTPATIENT
Start: 2024-05-13

## 2024-05-13 ASSESSMENT — PATIENT HEALTH QUESTIONNAIRE - PHQ9
SUM OF ALL RESPONSES TO PHQ QUESTIONS 1-9: 0
2. FEELING DOWN, DEPRESSED OR HOPELESS: NOT AT ALL
1. LITTLE INTEREST OR PLEASURE IN DOING THINGS: NOT AT ALL
SUM OF ALL RESPONSES TO PHQ QUESTIONS 1-9: 0
SUM OF ALL RESPONSES TO PHQ9 QUESTIONS 1 & 2: 0

## 2024-05-13 NOTE — PROGRESS NOTES
Chief Complaint   Patient presents with    Hemorrhoids     Rectal bleeding       \"Have you been to the ER, urgent care clinic since your last visit?  Hospitalized since your last visit?\"    NO    “Have you seen or consulted any other health care providers outside of [facility] since your last visit?”    NO            Click Here for Release of Records Request

## 2024-05-14 ENCOUNTER — PREP FOR PROCEDURE (OUTPATIENT)
Age: 64
End: 2024-05-14

## 2024-05-14 NOTE — PROGRESS NOTES
Pt was seen in office and scheduled an colonoscopy for 6/13/24 at 10:00am,   NPR for [employer] per [employer]

## 2024-05-20 NOTE — H&P (VIEW-ONLY)
Acacia Jarquin is a 64 y.o. female who presents today for the following:  Chief Complaint   Patient presents with    Hemorrhoids     Rectal bleeding           No Known Allergies    Current Outpatient Medications   Medication Sig Dispense Refill    Sod Picosulfate-Mag Ox-Cit Acd (CLENPIQ) 10-3.5-12 MG-GM -GM/160ML SOLN solution Take 1 Bottle by mouth every 6 hours 320 mL 0    sertraline (ZOLOFT) 100 MG tablet Take 1 tablet by mouth daily      methotrexate (RHEUMATREX) 2.5 MG chemo tablet Take 5 tablets by mouth Once a week at 5 PM      folic acid (FOLVITE) 1 MG tablet Take 1 tablet by mouth daily      etanercept (ENBREL) 25 MG injection Inject 25 mg into the skin Once a week at 5 PM      vitamin D (CHOLECALCIFEROL) 50 MCG (2000 UT) CAPS capsule Take 1 capsule by mouth daily      amLODIPine (NORVASC) 5 MG tablet Take 1 tablet by mouth daily      ALPRAZolam (XANAX) 0.5 MG tablet Take 1 tablet by mouth 2 times daily as needed.      calcium carbonate 600 MG TABS tablet Take 1 tablet by mouth daily as needed       No current facility-administered medications for this visit.       Past Medical History:   Diagnosis Date    Anxiety     Hypertension     Rectal bleeding 01/2024    Rheumatoid arthritis (HCC)     Spinal stenosis        Past Surgical History:   Procedure Laterality Date    HYSTERECTOMY (CERVIX STATUS UNKNOWN)         Family History   Problem Relation Age of Onset    High Blood Pressure Other     Diabetes Other        Social History     Socioeconomic History    Marital status:      Spouse name: Not on file    Number of children: Not on file    Years of education: Not on file    Highest education level: Not on file   Occupational History    Not on file   Tobacco Use    Smoking status: Never    Smokeless tobacco: Never   Vaping Use    Vaping Use: Never used   Substance and Sexual Activity    Alcohol use: Never    Drug use: Never    Sexual activity: Not on file   Other Topics Concern    Not on file   Social  constipation, diarrhea, nausea, rectal pain and vomiting.   Genitourinary: Negative.    Musculoskeletal:  Positive for arthralgias and back pain.   Skin: Negative.    Allergic/Immunologic: Negative.    Neurological: Negative.    Hematological: Negative.    Psychiatric/Behavioral: Negative.     All other systems reviewed and are negative.          /80 (Site: Left Upper Arm, Position: Sitting, Cuff Size: Medium Adult)   Pulse 77   Temp 98.1 °F (36.7 °C) (Oral)   Resp 18   Ht 1.702 m (5' 7\")   Wt 77 kg (169 lb 12.8 oz)   SpO2 97%   BMI 26.59 kg/m²     Physical Exam  Vitals and nursing note reviewed.   Constitutional:       Appearance: Normal appearance.   HENT:      Head: Normocephalic and atraumatic.   Eyes:      General: No scleral icterus.  Cardiovascular:      Rate and Rhythm: Normal rate and regular rhythm.      Pulses: Normal pulses.      Heart sounds: Normal heart sounds.   Pulmonary:      Effort: Pulmonary effort is normal.      Breath sounds: Normal breath sounds.   Abdominal:      General: There is no distension.      Palpations: There is no mass.      Tenderness: There is no abdominal tenderness. There is no right CVA tenderness, left CVA tenderness, guarding or rebound.      Hernia: No hernia is present.   Musculoskeletal:      Right lower leg: No edema.      Left lower leg: No edema.   Skin:     Coloration: Skin is not jaundiced.   Neurological:      General: No focal deficit present.      Mental Status: She is alert and oriented to person, place, and time. Mental status is at baseline.   Psychiatric:         Mood and Affect: Mood normal.         Behavior: Behavior normal.         Thought Content: Thought content normal.         Judgment: Judgment normal.        1. Rectal bleeding  We will schedule patient for a colonoscopy to evaluate the cause of the rectal bleeding.  - COLONOSCOPY,DIAGNOSTIC  - Sod Picosulfate-Mag Ox-Cit Acd (CLENPIQ) 10-3.5-12 MG-GM -GM/160ML SOLN solution; Take 1 Bottle

## 2024-05-30 ENCOUNTER — OFFICE VISIT (OUTPATIENT)
Facility: CLINIC | Age: 64
End: 2024-05-30
Payer: MEDICARE

## 2024-05-30 VITALS
SYSTOLIC BLOOD PRESSURE: 113 MMHG | HEART RATE: 84 BPM | TEMPERATURE: 97.6 F | HEIGHT: 67 IN | OXYGEN SATURATION: 97 % | WEIGHT: 165.38 LBS | RESPIRATION RATE: 20 BRPM | BODY MASS INDEX: 25.96 KG/M2 | DIASTOLIC BLOOD PRESSURE: 67 MMHG

## 2024-05-30 DIAGNOSIS — E53.8 B12 DEFICIENCY: ICD-10-CM

## 2024-05-30 DIAGNOSIS — F32.A ANXIETY AND DEPRESSION: ICD-10-CM

## 2024-05-30 DIAGNOSIS — F41.9 ANXIETY AND DEPRESSION: ICD-10-CM

## 2024-05-30 DIAGNOSIS — I10 PRIMARY HYPERTENSION: Primary | ICD-10-CM

## 2024-05-30 DIAGNOSIS — K62.5 RECTAL BLEEDING: ICD-10-CM

## 2024-05-30 DIAGNOSIS — M05.79 RHEUMATOID ARTHRITIS INVOLVING MULTIPLE SITES WITH POSITIVE RHEUMATOID FACTOR (HCC): ICD-10-CM

## 2024-05-30 DIAGNOSIS — E55.9 VITAMIN D DEFICIENCY: ICD-10-CM

## 2024-05-30 DIAGNOSIS — E66.3 OVERWEIGHT (BMI 25.0-29.9): ICD-10-CM

## 2024-05-30 PROCEDURE — G8419 CALC BMI OUT NRM PARAM NOF/U: HCPCS

## 2024-05-30 PROCEDURE — 1123F ACP DISCUSS/DSCN MKR DOCD: CPT

## 2024-05-30 PROCEDURE — G8427 DOCREV CUR MEDS BY ELIG CLIN: HCPCS

## 2024-05-30 PROCEDURE — 3074F SYST BP LT 130 MM HG: CPT

## 2024-05-30 PROCEDURE — 99214 OFFICE O/P EST MOD 30 MIN: CPT

## 2024-05-30 PROCEDURE — 1036F TOBACCO NON-USER: CPT

## 2024-05-30 PROCEDURE — 1090F PRES/ABSN URINE INCON ASSESS: CPT

## 2024-05-30 PROCEDURE — 3017F COLORECTAL CA SCREEN DOC REV: CPT

## 2024-05-30 PROCEDURE — G8400 PT W/DXA NO RESULTS DOC: HCPCS

## 2024-05-30 PROCEDURE — 3078F DIAST BP <80 MM HG: CPT

## 2024-05-30 RX ORDER — TOPIRAMATE 50 MG/1
50 TABLET, FILM COATED ORAL 2 TIMES DAILY
COMMUNITY
Start: 2024-05-26

## 2024-05-30 RX ORDER — POTASSIUM CHLORIDE 750 MG/1
10 TABLET, FILM COATED, EXTENDED RELEASE ORAL DAILY
COMMUNITY
Start: 2024-05-23

## 2024-05-30 ASSESSMENT — ENCOUNTER SYMPTOMS
CHEST TIGHTNESS: 0
TROUBLE SWALLOWING: 0
SINUS PAIN: 0
GASTROINTESTINAL NEGATIVE: 1
SINUS PRESSURE: 0
VOICE CHANGE: 0
SORE THROAT: 0
CHOKING: 0
EYES NEGATIVE: 1
WHEEZING: 0
SHORTNESS OF BREATH: 0
APNEA: 0

## 2024-05-30 ASSESSMENT — PATIENT HEALTH QUESTIONNAIRE - PHQ9
SUM OF ALL RESPONSES TO PHQ9 QUESTIONS 1 & 2: 1
SUM OF ALL RESPONSES TO PHQ QUESTIONS 1-9: 1
1. LITTLE INTEREST OR PLEASURE IN DOING THINGS: NOT AT ALL
SUM OF ALL RESPONSES TO PHQ QUESTIONS 1-9: 1
2. FEELING DOWN, DEPRESSED OR HOPELESS: SEVERAL DAYS

## 2024-05-30 NOTE — PROGRESS NOTES
Acacia Jarquin is a 64 y.o. female who presents to the office today for the following:    Chief Complaint   Patient presents with    Hypertension    Depression    Cholesterol Problem    Anxiety       Past Medical History:   Diagnosis Date    Anxiety     Hypertension     Rectal bleeding 01/2024    Rheumatoid arthritis (HCC)     Spinal stenosis         Past Surgical History:   Procedure Laterality Date    HYSTERECTOMY (CERVIX STATUS UNKNOWN)          Family History   Problem Relation Age of Onset    High Blood Pressure Other     Diabetes Other         Social History     Tobacco Use    Smoking status: Never    Smokeless tobacco: Never   Vaping Use    Vaping Use: Never used   Substance Use Topics    Alcohol use: Never    Drug use: Never        HPI  Patient here for follow-up of chronic conditions with [facility] hypertension, hyperlipidemia, obesity, anxiety, depression, bilateral SNHL, rheumatoid arthritis vitamin D deficiency.  Last mammogram 3/7/2024. Hysterectomy. Had last colonoscopy at approx 5 years ago by Elie.  Follows with Dr. Ramirez, [place] [facility] for thyroid nodules.  Low suspicion for malignancy therefore scheduled 6/20/2024 for repeat ultrasound for close monitoring.  Per last note the nodules appear to be growing and biopsy will be performed at that time. Also follows with [facility].  She has no complaints today.    Chief Complaint   Patient presents with    Hypertension    Depression    Cholesterol Problem    Anxiety       Current Outpatient Medications on File Prior to Visit   Medication Sig Dispense Refill    Sod Picosulfate-Mag Ox-Cit Acd (CLENPIQ) 10-3.5-12 MG-GM -GM/160ML SOLN solution Take 1 Bottle by mouth every 6 hours 320 mL 0    sertraline (ZOLOFT) 100 MG tablet Take 1 tablet by mouth daily      methotrexate (RHEUMATREX) 2.5 MG chemo tablet Take 5 tablets by mouth Once a week at 5 PM      folic acid (FOLVITE) 1 MG tablet Take 1 tablet by mouth daily      etanercept (ENBREL)

## 2024-05-30 NOTE — PROGRESS NOTES
No chief complaint on file.    \"Have you been to the ER, urgent care clinic since your last visit?  Hospitalized since your last visit?\"    NO    “Have you seen or consulted any other health care providers outside of [facility] since your last visit?”    NO            Click Here for Release of Records Request

## 2024-05-31 LAB
25(OH)D3+25(OH)D2 SERPL-MCNC: 67.4 NG/ML (ref 30–100)
ALBUMIN SERPL-MCNC: 4.6 G/DL (ref 3.9–4.9)
ALBUMIN/GLOB SERPL: 1.6 {RATIO} (ref 1.2–2.2)
ALP SERPL-CCNC: 73 IU/L (ref 44–121)
ALT SERPL-CCNC: 13 IU/L (ref 0–32)
APPEARANCE UR: CLEAR
AST SERPL-CCNC: 22 IU/L (ref 0–40)
BACTERIA #/AREA URNS HPF: NORMAL /[HPF]
BASOPHILS # BLD AUTO: 0 X10E3/UL (ref 0–0.2)
BASOPHILS NFR BLD AUTO: 1 %
BILIRUB SERPL-MCNC: 0.8 MG/DL (ref 0–1.2)
BILIRUB UR QL STRIP: NEGATIVE
BUN SERPL-MCNC: 13 MG/DL (ref 8–27)
BUN/CREAT SERPL: 14 (ref 12–28)
CALCIUM SERPL-MCNC: 10.1 MG/DL (ref 8.7–10.3)
CASTS URNS QL MICRO: NORMAL /LPF
CHLORIDE SERPL-SCNC: 104 MMOL/L (ref 96–106)
CHOLEST SERPL-MCNC: 201 MG/DL (ref 100–199)
CO2 SERPL-SCNC: 26 MMOL/L (ref 20–29)
COLOR UR: YELLOW
CREAT SERPL-MCNC: 0.92 MG/DL (ref 0.57–1)
EGFRCR SERPLBLD CKD-EPI 2021: 69 ML/MIN/1.73
EOSINOPHIL # BLD AUTO: 0 X10E3/UL (ref 0–0.4)
EOSINOPHIL NFR BLD AUTO: 1 %
EPI CELLS #/AREA URNS HPF: NORMAL /HPF (ref 0–10)
ERYTHROCYTE [DISTWIDTH] IN BLOOD BY AUTOMATED COUNT: 13.6 % (ref 11.7–15.4)
FOLATE SERPL-MCNC: >20 NG/ML
GLOBULIN SER CALC-MCNC: 2.9 G/DL (ref 1.5–4.5)
GLUCOSE SERPL-MCNC: 83 MG/DL (ref 70–99)
GLUCOSE UR QL STRIP: NEGATIVE
HCT VFR BLD AUTO: 36.8 % (ref 34–46.6)
HDLC SERPL-MCNC: 85 MG/DL
HGB BLD-MCNC: 12.3 G/DL (ref 11.1–15.9)
HGB UR QL STRIP: NEGATIVE
IMM GRANULOCYTES # BLD AUTO: 0 X10E3/UL (ref 0–0.1)
IMM GRANULOCYTES NFR BLD AUTO: 0 %
KETONES UR QL STRIP: NEGATIVE
LDLC SERPL CALC-MCNC: 106 MG/DL (ref 0–99)
LEUKOCYTE ESTERASE UR QL STRIP: NEGATIVE
LYMPHOCYTES # BLD AUTO: 1.5 X10E3/UL (ref 0.7–3.1)
LYMPHOCYTES NFR BLD AUTO: 44 %
MCH RBC QN AUTO: 31.6 PG (ref 26.6–33)
MCHC RBC AUTO-ENTMCNC: 33.4 G/DL (ref 31.5–35.7)
MCV RBC AUTO: 95 FL (ref 79–97)
MICRO URNS: NORMAL
MICRO URNS: NORMAL
MONOCYTES # BLD AUTO: 0.4 X10E3/UL (ref 0.1–0.9)
MONOCYTES NFR BLD AUTO: 11 %
NEUTROPHILS # BLD AUTO: 1.4 X10E3/UL (ref 1.4–7)
NEUTROPHILS NFR BLD AUTO: 43 %
NITRITE UR QL STRIP: NEGATIVE
PH UR STRIP: 7 [PH] (ref 5–7.5)
PLATELET # BLD AUTO: 169 X10E3/UL (ref 150–450)
POTASSIUM SERPL-SCNC: 4 MMOL/L (ref 3.5–5.2)
PROT SERPL-MCNC: 7.5 G/DL (ref 6–8.5)
PROT UR QL STRIP: NEGATIVE
RBC # BLD AUTO: 3.89 X10E6/UL (ref 3.77–5.28)
RBC #/AREA URNS HPF: NORMAL /HPF (ref 0–2)
SODIUM SERPL-SCNC: 142 MMOL/L (ref 134–144)
SP GR UR STRIP: 1.01 (ref 1–1.03)
T4 FREE SERPL-MCNC: 1.1 NG/DL (ref 0.82–1.77)
TRIGL SERPL-MCNC: 55 MG/DL (ref 0–149)
TSH SERPL DL<=0.005 MIU/L-ACNC: 1.23 UIU/ML (ref 0.45–4.5)
UROBILINOGEN UR STRIP-MCNC: 0.2 MG/DL (ref 0.2–1)
VIT B12 SERPL-MCNC: 1388 PG/ML (ref 232–1245)
VLDLC SERPL CALC-MCNC: 10 MG/DL (ref 5–40)
WBC # BLD AUTO: 3.4 X10E3/UL (ref 3.4–10.8)
WBC #/AREA URNS HPF: NORMAL /HPF (ref 0–5)

## 2024-06-13 ENCOUNTER — ANESTHESIA EVENT (OUTPATIENT)
Facility: HOSPITAL | Age: 64
End: 2024-06-13
Payer: MEDICARE

## 2024-06-13 ENCOUNTER — HOSPITAL ENCOUNTER (OUTPATIENT)
Facility: HOSPITAL | Age: 64
Setting detail: OUTPATIENT SURGERY
Discharge: HOME | End: 2024-06-13
Attending: INTERNAL MEDICINE | Admitting: INTERNAL MEDICINE
Payer: MEDICARE

## 2024-06-13 ENCOUNTER — ANESTHESIA (OUTPATIENT)
Facility: HOSPITAL | Age: 64
End: 2024-06-13
Payer: MEDICARE

## 2024-06-13 VITALS
HEART RATE: 82 BPM | BODY MASS INDEX: 27.02 KG/M2 | RESPIRATION RATE: 16 BRPM | OXYGEN SATURATION: 100 % | WEIGHT: 162.2 LBS | SYSTOLIC BLOOD PRESSURE: 143 MMHG | DIASTOLIC BLOOD PRESSURE: 87 MMHG | HEIGHT: 65 IN | TEMPERATURE: 98.9 F

## 2024-06-13 DIAGNOSIS — K64.8 INTERNAL HEMORRHOIDS WITH COMPLICATION: Primary | ICD-10-CM

## 2024-06-13 PROCEDURE — 3700000001 HC ADD 15 MINUTES (ANESTHESIA): Performed by: INTERNAL MEDICINE

## 2024-06-13 PROCEDURE — 7100000011 HC PHASE II RECOVERY - ADDTL 15 MIN: Performed by: INTERNAL MEDICINE

## 2024-06-13 PROCEDURE — 3700000000 HC ANESTHESIA ATTENDED CARE: Performed by: INTERNAL MEDICINE

## 2024-06-13 PROCEDURE — 2580000003 HC RX 258: Performed by: INTERNAL MEDICINE

## 2024-06-13 PROCEDURE — 3600007512: Performed by: INTERNAL MEDICINE

## 2024-06-13 PROCEDURE — 88305 TISSUE EXAM BY PATHOLOGIST: CPT

## 2024-06-13 PROCEDURE — 7100000010 HC PHASE II RECOVERY - FIRST 15 MIN: Performed by: INTERNAL MEDICINE

## 2024-06-13 PROCEDURE — 3600007502: Performed by: INTERNAL MEDICINE

## 2024-06-13 PROCEDURE — 6360000002 HC RX W HCPCS: Performed by: NURSE ANESTHETIST, CERTIFIED REGISTERED

## 2024-06-13 PROCEDURE — 2709999900 HC NON-CHARGEABLE SUPPLY: Performed by: INTERNAL MEDICINE

## 2024-06-13 RX ORDER — PROPOFOL 10 MG/ML
INJECTION, EMULSION INTRAVENOUS PRN
Status: DISCONTINUED | OUTPATIENT
Start: 2024-06-13 | End: 2024-06-13 | Stop reason: SDUPTHER

## 2024-06-13 RX ORDER — SODIUM CHLORIDE 9 MG/ML
25 INJECTION, SOLUTION INTRAVENOUS PRN
Status: DISCONTINUED | OUTPATIENT
Start: 2024-06-13 | End: 2024-06-13 | Stop reason: HOSPADM

## 2024-06-13 RX ADMIN — PROPOFOL 50 MG: 10 INJECTION, EMULSION INTRAVENOUS at 14:29

## 2024-06-13 RX ADMIN — SODIUM CHLORIDE 25 ML: 9 INJECTION, SOLUTION INTRAVENOUS at 11:45

## 2024-06-13 RX ADMIN — PROPOFOL 50 MG: 10 INJECTION, EMULSION INTRAVENOUS at 14:20

## 2024-06-13 RX ADMIN — PROPOFOL 100 MG: 10 INJECTION, EMULSION INTRAVENOUS at 14:18

## 2024-06-13 RX ADMIN — PROPOFOL 50 MG: 10 INJECTION, EMULSION INTRAVENOUS at 14:25

## 2024-06-13 RX ADMIN — PROPOFOL 50 MG: 10 INJECTION, EMULSION INTRAVENOUS at 14:13

## 2024-06-13 ASSESSMENT — PAIN - FUNCTIONAL ASSESSMENT
PAIN_FUNCTIONAL_ASSESSMENT: 0-10

## 2024-06-13 NOTE — DISCHARGE INSTRUCTIONS
For the next 12 hours you should not:   1. drive   2. drink alcohol   3. operate any machinery   4. engage in activities that require mental sharpness or manual dexterity such as     cooking   5. take any drugs other than those prescribed by a physician   6. make any legal or financial decisions    Call your doctor's office immediately, if there is is anything unusual:   1. increased and continuing rectal bleeding   2. fever   3. Unusual abdominal pain    Take it easy today and resume normal activity tomorrow.It is common to have gas and mild bloating for a few hours. Pain is NOT normal. You may be groggy off and on for a few hours.    Resume previous diet.        Trent Delgadillo Jr, MD  6/13/2024  2:50 PM

## 2024-06-13 NOTE — ANESTHESIA POSTPROCEDURE EVALUATION
Department of Anesthesiology  Postprocedure Note    Patient: Acacia Jarquin  MRN: [id number]  YOB: 1960  Date of evaluation: 6/13/2024    Procedure Summary       Date: 06/13/24 Room / Location: [facility] ENDO 01 / [facility] END[facility]COPY    Anesthesia Start: 1408 Anesthesia Stop: 1447    Procedure: COLONOSCOPY biopsy (Anus) Diagnosis:       Rectal bleeding      (Rectal bleeding [K62.5])    Surgeons: Trent Delgadillo Jr., MD Responsible Provider: Diettrich, Claude G, APRN - CRNA    Anesthesia Type: TIVA ASA Status: 3            Anesthesia Type: No value filed.    Abhishek Phase I: Abhishek Score: 10    Abhishek Phase II: Abhishek Score: 10    Anesthesia Post Evaluation    Patient location during evaluation: PACU  Patient participation: complete - patient participated  Level of consciousness: awake  Airway patency: patent  Nausea & Vomiting: no vomiting and no nausea  Cardiovascular status: blood pressure returned to baseline and hemodynamically stable  Respiratory status: room air  Hydration status: stable  Multimodal analgesia pain management approach    No notable events documented.

## 2024-06-13 NOTE — OP NOTE
Colonoscopy Procedure Note      Patient: Acacia Jarquin MRN: [id number]  SSN: [id number]    YOB: 1960  Age: 64 y.o.  Sex: female      Date of Procedure: 6/13/2024  Date/Time:  6/13/2024 2:42 PM       IMPRESSION:     1.  Descending colon polyp   2.  Melanosis coli              3.  Inflamed internal hemorrhoids (grade 2)              4.  External hemorrhoids         RECOMMENDATIONS:     1) Check biopsy results.  2) Await pathology report. Call me in 2 weeks if you have not received any information from my office regarding your results.  3) Repeat colonoscopy in 2 to 3 years or as determined by the pathology report.       INDICATION: Rectal bleeding    PROCEDURE PERFORMED: Colonoscopy with cold biopsy     DESCRIPTION OF PROCEDURE: An informed consent was obtained.  The patient was placed in left lateral position.  Perianal inspection and a digital rectal exam was performed.  Video colonoscope was introduced into the rectum and advanced under direct vision up to the terminal ileum.  With adequate insufflation and maneuvering of the withdrawing scope, the colonic mucosa was visualized carefully.  Retroflexion was performed in the rectum to see the anorectum and also in the ascending colon to look behind the folds.  Vital signs, pulse oximetry, single lead cardiac monitor were monitored throughout the procedure as the sedation was titrated to the desired effect ensuring patient comfort and safety.  The patient tolerated the procedure very well and was transferred to the recovery area. Following is the summary of findings: Patient had generalized melanosis coli which was more prominent in certain areas.  Patient presents to small polyp in the descending colon measured 0.4 cm that was removed via cold biopsies.  As we went through the anal canal with inflamed internal hemorrhoids without active bleeding.  Patient also was noted to have external hemorrhoids that were noted on initial examination and as we

## 2024-06-13 NOTE — INTERVAL H&P NOTE
Update History & Physical    The patient's History and Physical of June 13, 2024 was reviewed with the patient and I examined the patient. There was no change. The surgical site was confirmed by the patient and me.     Plan: The risks, benefits, expected outcome, and alternative to the recommended procedure have been discussed with the patient. Patient understands and wants to proceed with the procedure.     Electronically signed by Trent Delgadillo Jr, MD on 6/13/2024 at 11:13 AM

## 2024-06-13 NOTE — ANESTHESIA PRE PROCEDURE
\"LABABO\"    Drug/Infectious Status (If Applicable):  No results found for: \"HIV\", \"HEPCAB\"    COVID-19 Screening (If Applicable): No results found for: \"COVID19\"        Anesthesia Evaluation    Airway: Mallampati: II  TM distance: >3 FB   Neck ROM: full  Mouth opening: > = 3 FB   Dental: normal exam         Pulmonary:normal exam  breath sounds clear to auscultation  (+)     sleep apnea:                                  Cardiovascular:    (+) hypertension:        Rhythm: regular  Rate: normal                    Neuro/Psych:   (+) psychiatric history:            GI/Hepatic/Renal:             Endo/Other:    (+) : arthritis:..                 Abdominal:             Vascular:          Other Findings:       Anesthesia Plan      TIVA     ASA 3       Induction: intravenous.      Anesthetic plan and risks discussed with patient.                    Claude G Diettrich, APRN - CRNA   6/13/2024

## 2024-07-16 ENCOUNTER — HOSPITAL ENCOUNTER (OUTPATIENT)
Facility: HOSPITAL | Age: 64
Discharge: HOME | End: 2024-07-19
Attending: STUDENT IN AN ORGANIZED HEALTH CARE EDUCATION/TRAINING PROGRAM
Payer: MEDICARE

## 2024-07-16 DIAGNOSIS — E04.1 THYROID NODULE: ICD-10-CM

## 2024-07-16 PROCEDURE — 76536 US EXAM OF HEAD AND NECK: CPT

## 2024-08-09 ENCOUNTER — APPOINTMENT (OUTPATIENT)
Facility: HOSPITAL | Age: 64
End: 2024-08-09
Payer: MEDICARE

## 2024-08-09 ENCOUNTER — HOSPITAL ENCOUNTER (EMERGENCY)
Facility: HOSPITAL | Age: 64
Discharge: HOME | End: 2024-08-09
Attending: EMERGENCY MEDICINE
Payer: MEDICARE

## 2024-08-09 VITALS
RESPIRATION RATE: 16 BRPM | SYSTOLIC BLOOD PRESSURE: 119 MMHG | HEART RATE: 88 BPM | WEIGHT: 162.7 LBS | TEMPERATURE: 97.7 F | OXYGEN SATURATION: 100 % | HEIGHT: 67 IN | DIASTOLIC BLOOD PRESSURE: 83 MMHG | BODY MASS INDEX: 25.54 KG/M2

## 2024-08-09 DIAGNOSIS — M54.50 ACUTE MIDLINE LOW BACK PAIN WITHOUT SCIATICA: ICD-10-CM
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
## 2024-08-09 PROCEDURE — 99283 EMERGENCY DEPT VISIT LOW MDM: CPT

## 2024-08-09 PROCEDURE — 72100 X-RAY EXAM L-S SPINE 2/3 VWS: CPT

## 2024-08-09 RX ORDER — KETOROLAC TROMETHAMINE 10 MG/1
10 TABLET, FILM COATED ORAL 3 TIMES DAILY PRN
Qty: 15 TABLET | Refills: 0 | Status: SHIPPED | OUTPATIENT
Start: 2024-08-09

## 2024-08-09 RX ORDER — METHOCARBAMOL 750 MG/1
750 TABLET, FILM COATED ORAL 3 TIMES DAILY
Qty: 15 TABLET | Refills: 0 | Status: SHIPPED | OUTPATIENT
Start: 2024-08-09 | End: 2024-08-14

## 2024-08-09 ASSESSMENT — PAIN DESCRIPTION - LOCATION: LOCATION: BACK

## 2024-08-09 ASSESSMENT — PAIN SCALES - GENERAL: PAINLEVEL_OUTOF10: 4

## 2024-08-09 ASSESSMENT — ENCOUNTER SYMPTOMS
ABDOMINAL PAIN: 0
NAUSEA: 0
COUGH: 0
SHORTNESS OF BREATH: 0
VOMITING: 0
BACK PAIN: 1
TROUBLE SWALLOWING: 0
COLOR CHANGE: 0

## 2024-08-09 ASSESSMENT — PAIN DESCRIPTION - ORIENTATION: ORIENTATION: LOWER

## 2024-08-09 ASSESSMENT — PAIN - FUNCTIONAL ASSESSMENT
PAIN_FUNCTIONAL_ASSESSMENT: 0-10
PAIN_FUNCTIONAL_ASSESSMENT: ACTIVITIES ARE NOT PREVENTED

## 2024-08-09 ASSESSMENT — PAIN DESCRIPTION - PAIN TYPE: TYPE: ACUTE PAIN

## 2024-08-09 ASSESSMENT — PAIN DESCRIPTION - FREQUENCY: FREQUENCY: CONTINUOUS

## 2024-08-09 ASSESSMENT — PAIN DESCRIPTION - DESCRIPTORS: DESCRIPTORS: ACHING;DISCOMFORT
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
## 2024-08-12 ENCOUNTER — OFFICE VISIT (OUTPATIENT)
Facility: CLINIC | Age: 64
End: 2024-08-12
Payer: MEDICARE

## 2024-08-12 VITALS
HEART RATE: 77 BPM | SYSTOLIC BLOOD PRESSURE: 125 MMHG | TEMPERATURE: 97.9 F | HEIGHT: 67 IN | WEIGHT: 160.4 LBS | DIASTOLIC BLOOD PRESSURE: 79 MMHG | OXYGEN SATURATION: 98 % | BODY MASS INDEX: 25.18 KG/M2 | RESPIRATION RATE: 18 BRPM

## 2024-08-12 DIAGNOSIS — M54.50 ACUTE BILATERAL LOW BACK PAIN WITHOUT SCIATICA: Primary | ICD-10-CM

## 2024-08-12 PROCEDURE — 1123F ACP DISCUSS/DSCN MKR DOCD: CPT

## 2024-08-12 PROCEDURE — 99213 OFFICE O/P EST LOW 20 MIN: CPT

## 2024-08-12 PROCEDURE — 1090F PRES/ABSN URINE INCON ASSESS: CPT

## 2024-08-12 PROCEDURE — G8419 CALC BMI OUT NRM PARAM NOF/U: HCPCS

## 2024-08-12 PROCEDURE — 1036F TOBACCO NON-USER: CPT

## 2024-08-12 PROCEDURE — G8427 DOCREV CUR MEDS BY ELIG CLIN: HCPCS

## 2024-08-12 PROCEDURE — G8400 PT W/DXA NO RESULTS DOC: HCPCS

## 2024-08-12 PROCEDURE — 3017F COLORECTAL CA SCREEN DOC REV: CPT

## 2024-08-12 RX ORDER — LORAZEPAM 0.5 MG/1
0.5 TABLET ORAL EVERY 6 HOURS PRN
COMMUNITY

## 2024-08-12 ASSESSMENT — PATIENT HEALTH QUESTIONNAIRE - PHQ9
SUM OF ALL RESPONSES TO PHQ QUESTIONS 1-9: 0
SUM OF ALL RESPONSES TO PHQ QUESTIONS 1-9: 0
SUM OF ALL RESPONSES TO PHQ9 QUESTIONS 1 & 2: 0
SUM OF ALL RESPONSES TO PHQ QUESTIONS 1-9: 0
SUM OF ALL RESPONSES TO PHQ QUESTIONS 1-9: 0
2. FEELING DOWN, DEPRESSED OR HOPELESS: NOT AT ALL
1. LITTLE INTEREST OR PLEASURE IN DOING THINGS: NOT AT ALL

## 2024-08-12 ASSESSMENT — ENCOUNTER SYMPTOMS
RESPIRATORY NEGATIVE: 1
EYES NEGATIVE: 1
GASTROINTESTINAL NEGATIVE: 1
BACK PAIN: 1
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
## 2024-08-14 ENCOUNTER — HOSPITAL ENCOUNTER (OUTPATIENT)
Facility: HOSPITAL | Age: 64
Setting detail: RECURRING SERIES
Discharge: HOME | End: 2024-08-17
Payer: MEDICARE

## 2024-08-14 PROCEDURE — 97161 PT EVAL LOW COMPLEX 20 MIN: CPT

## 2024-08-14 NOTE — THERAPY EVALUATION
for valid certification **  Please sign and fax to [phone].  Thank you    Patient Name:  Acacia Jarquin :  1960

## 2024-08-19 ENCOUNTER — HOSPITAL ENCOUNTER (OUTPATIENT)
Facility: HOSPITAL | Age: 64
Setting detail: RECURRING SERIES
Discharge: HOME | End: 2024-08-22
Payer: MEDICARE

## 2024-08-19 PROCEDURE — 97110 THERAPEUTIC EXERCISES: CPT

## 2024-08-19 PROCEDURE — G0283 ELEC STIM OTHER THAN WOUND: HCPCS

## 2024-08-19 NOTE — PROGRESS NOTES
x20                          seated HS stretch          30\"x3                                                                                                             [x] Eval only for Running Visit #1, no treatment provided     Modalities to be included future treatment sessions: Electrical Stimulation and Heat Pack  Has HEP been provided to patient? [] No    [x] Yes                                      Access Code:   [email or web address]                  Date Provided: 08/14/2024  [] Reviewed HEP    [] Progressed/Changed HEP, details:     GOALS  Short Term Goals, to be met within 5 treatments:  1. Patient will demonstrate independence and compliance with HEP in order to increase mobility and assist with carryover from PT services.  Status at last Eval/Progress Note: provided  Current Status: provided  Goal Met? No     2. Patient will be able to stand for >30 minutes without fatigue and pain >3/10 in lumbar spine,   Status at last Eval/Progress Note: few minutes  Current Status: few minutes  Goal Met? No     3. Patient will have less than 4/10 pain when carrying groceries into home.   Status at last Eval/Progress Note: >5/10 pain  Current Status: >5/10 pain  Goal Met? No    Long Term Goals, to be met within 10 treatments:  1.Patient will be able to sit for >60 minutes with less then 2/10 pain to travel for appts.   Status at last Eval/Progress Note: cannot sit long  Current Status: cannot sit long  Goal Met? No     2. Patient will be able to squat to pick objects off the floor without limitations for house and yard work.  Status at last Eval/Progress Note: pain when bending or squating  Current Status: pain when bending or squating  Goal Met? No     3. Patient will improve lumbar extension  to 30 deg to be able to perform workout routines with less limitations. .    Status at last Eval/Progress Note: 10 deg  Current Status: 10 deg  Goal Met? No     []  See Progress Note/Recertification  []  See Discharge

## 2024-08-21 ENCOUNTER — HOSPITAL ENCOUNTER (OUTPATIENT)
Facility: HOSPITAL | Age: 64
Setting detail: RECURRING SERIES
Discharge: HOME | End: 2024-08-24
Payer: MEDICARE

## 2024-08-21 PROCEDURE — 97110 THERAPEUTIC EXERCISES: CPT

## 2024-08-21 PROCEDURE — G0283 ELEC STIM OTHER THAN WOUND: HCPCS

## 2024-08-21 PROCEDURE — 97150 GROUP THERAPEUTIC PROCEDURES: CPT

## 2024-08-21 NOTE — PROGRESS NOTES
patient's ability to progress to PLOF and address remaining functional goals.    10   min [x] Estim Unattended,             []  NMES  [] PreMod       [x]  IFC  [] Other:  []w/US   []w/ice   [x]w/heat  Position: seated  Location: low back            min []  Mechanical Traction,        []  Cervical      []  Lumbar        []  Prone         []  Supine           []  Intermitt.     []  Cont.     Lbs:    pounds  [] With heat  [] Simultaneously performed with E-Stim    min []  Ultrasound,    []Continuous   [] Pulsed  []1MHz   []3MHz Location:  W/cm2:    min  Unbilled []  Ice     []  Heat             Position:  Location:            min []  Vasopneumatic Device,      pre-treatment girth :  cm   post-treatment girth : cm   measured at (landmark       location) :  Pressure:       [] lo [] med [] hi   Temperature: [] lo [] med [] Hi    [] With ice    [] Simultaneously performed with Estim     Skin assessment post-treatment (if applicable):    [x]  intact    [x]  redness- no adverse reaction []redness - adverse reaction:        Pain Level at end of session (0-10 scale): 0/10    Assessment   Patient tolerated treatment well today. She performed exercises well and had routine mostly down. Added in prayer stretch with ball today which she tolerated well. Talked about TENS unit for home use if needed.  Patient will continue to benefit from skilled PT services to modify and progress therapeutic interventions, analyze and address functional mobility deficits, analyze and address ROM deficits, and analyze and address strength deficits to address functional deficits and attain remaining goals.     PRECAUTIONS:  None at this time        Date of Initial Evaluation: 8/14/2024  Date of last Progress Note: n/a  Visit # of last Progress Note: 1      Number of Insurance Authorized visits: 10 visits until 10/14          Therapeutic Exercise Flow Sheet:                                                                                    Running

## 2024-08-22 ENCOUNTER — APPOINTMENT (OUTPATIENT)
Facility: HOSPITAL | Age: 64
End: 2024-08-22
Payer: MEDICARE

## 2024-08-28 ENCOUNTER — HOSPITAL ENCOUNTER (OUTPATIENT)
Facility: HOSPITAL | Age: 64
Setting detail: RECURRING SERIES
Discharge: HOME | End: 2024-08-31
Payer: MEDICARE

## 2024-08-28 PROCEDURE — 97110 THERAPEUTIC EXERCISES: CPT

## 2024-08-28 PROCEDURE — G0283 ELEC STIM OTHER THAN WOUND: HCPCS

## 2024-08-28 NOTE — PROGRESS NOTES
PHYSICAL THERAPY - MEDICARE DAILY TREATMENT NOTE (updated 3/23)    Date of Service: 2024        Patient Name:  Acacia Jarquin :  1960   Medical   Diagnosis:  Acute bilateral low back pain without sciatica [M54.50] Treatment Diagnosis:  M54.59  OTHER LOWER BACK PAIN    Referral Source:  Isabelle Baldwin APRN - C* Insurance:   Payor: [employer] MEDICARE / Plan: [employer] CHOICE-PPO MEDICARE / Product Type: *No Product type* /    [x] Patient's date of birth verified                      Visit #   Current  / Total 4 10   Time   In / Out 1059 1203   Total Treatment Time (in minutes) 64    Total Timed Codes (in minutes) 54    1:1 Treatment Time (in minutes) 54       [facility] Totals Reminder:  bill using total billable   min of TIMED therapeutic procedures and modalities.   8-22 min = 1 unit; 23-37 min = 2 units; 38-52 min = 3 units; 53-67 min = 4 units; 68-82 min = 5 units        SUBJECTIVE  Pain Level (0-10 scale): 4/10    Any medication changes, allergies to medications, adverse drug reactions, diagnosis change, or new procedure performed?: No  Medications: [x] Verified on Patient Summary List    Subjective functional status/changes:     \"It's a 4. I try not to let it get me.\"    OBJECTIVE  Therapeutic Procedures:  Tx Min Billable or 1:1 Min (if diff from Tx Min) Procedure, Rationale, Specifics   54  03750 Therapeutic Exercise (timed):  increase ROM, strength, coordination, balance, and proprioception to improve patient's ability to progress to PLOF and address remaining functional goals. (see flow sheet as applicable)   54     Total Total   [x] Patient Education billed concurrently with other procedures     Modalities Rationale:     decrease pain and increase tissue extensibility to improve patient's ability to progress to PLOF and address remaining functional goals.    10   min [x] Estim Unattended,             []  NMES  [] PreMod       [x]  IFC  [] Other:  []w/US   []w/ice   [x]w/heat  Position: seated  Location:

## 2024-08-30 ENCOUNTER — HOSPITAL ENCOUNTER (OUTPATIENT)
Facility: HOSPITAL | Age: 64
Setting detail: RECURRING SERIES
End: 2024-08-30
Payer: MEDICARE

## 2024-08-30 PROCEDURE — G0283 ELEC STIM OTHER THAN WOUND: HCPCS

## 2024-08-30 PROCEDURE — 97150 GROUP THERAPEUTIC PROCEDURES: CPT

## 2024-08-30 PROCEDURE — 97110 THERAPEUTIC EXERCISES: CPT

## 2024-08-30 NOTE — PROGRESS NOTES
PHYSICAL THERAPY - MEDICARE DAILY TREATMENT NOTE (updated 3/23)    Date of Service: 2024        Patient Name:  Acacia Jarquin :  1960   Medical   Diagnosis:  Acute bilateral low back pain without sciatica [M54.50] Treatment Diagnosis:  M54.59  OTHER LOWER BACK PAIN    Referral Source:  Isabelle Baldwin APRN - C* Insurance:   Payor: [employer] MEDICARE / Plan: [employer] CHOICE-PPO MEDICARE / Product Type: *No Product type* /    [x] Patient's date of birth verified                      Visit #   Current  / Total 5 10   Time   In / Out 1044 am 1150 am   Total Treatment Time (in minutes) 66    Total Timed Codes (in minutes) 54    1:1 Treatment Time (in minutes) 30       [facility] Totals Reminder:  bill using total billable   min of TIMED therapeutic procedures and modalities.   8-22 min = 1 unit; 23-37 min = 2 units; 38-52 min = 3 units; 53-67 min = 4 units; 68-82 min = 5 units        SUBJECTIVE  Pain Level (0-10 scale): 4-5/10    Any medication changes, allergies to medications, adverse drug reactions, diagnosis change, or new procedure performed?: No  Medications: [x] Verified on Patient Summary List    Subjective functional status/changes:     \"Pt reports that her back is still a nagging pain at times .\"    OBJECTIVE  Therapeutic Procedures:  Tx Min Billable or 1:1 Min (if diff from Tx Min) Procedure, Rationale, Specifics   30 30 69205 Therapeutic Exercise (timed):  increase ROM, strength, coordination, balance, and proprioception to improve patient's ability to progress to PLOF and address remaining functional goals. (see flow sheet as applicable)   24 0 [id number] Group Therapy (untimed): Billed while completing therapeutic exercise during beginning of treatment session to improve patient's ability to progress to PLOF and address remaining functional goals.  (see flow sheet as applicable)   54 30    Total Total   [x] Patient Education billed concurrently with other procedures     Modalities Rationale:     decrease pain,  8/14/2024  Date of last Progress Note: n/a  Visit # of last Progress Note: 1      Number of Insurance Authorized visits: 10 visits until 10/14          Therapeutic Exercise Flow Sheet:                                                                                    Running Visit #    EXERCISE   1   2   3   4   5   6   7   8   9   10      NuStep         Lvl 2 x12'     Lvl 2 x12'     Lvl 3 x12'  lvl 3.5 hills x 12'                LTR         x20      x20    x20   x 20                 Supine Marches          x20    X20 B     X20 B  With PPT x 20 alt                  Piriformis Stretch       2 way  30\"x3       2 way  30\"x3 ea B  2 way  30\"x3  2 way  20\"x3                 SKTC          30\"x3    3x30 sec B     30\"x3  30\"x3                Bridges          x20    x20     x20  x 20                  seated HS stretch          30\"x3    3x30 sec B     30\"x3  30\"x3 seated with step                 3 Way Ball Stretch                3x30 sec ea     30\"x3 3/30\"              Wall ext                 5\"/10              [x] Eval only for Running Visit #1, no treatment provided     Modalities to be included future treatment sessions: Electrical Stimulation and Heat Pack  Has HEP been provided to patient? [] No    [x] Yes      Access Code:   [email or web address]    Date Provided: 08/14/2024  [] Reviewed HEP    [] Progressed/Changed HEP, details:      GOALS  Short Term Goals, to be met within 5 treatments:  1. Patient will demonstrate independence and compliance with HEP in order to increase mobility and assist with carryover from PT services.  Status at last Eval/Progress Note: provided  Current Status: provided  Goal Met? No     2. Patient will be able to stand for >30 minutes without fatigue and pain >3/10 in lumbar spine,   Status at last Eval/Progress Note: few minutes  Current Status: few minutes  Goal Met? No     3. Patient will have less than 4/10 pain when carrying groceries into home.   Status at last Eval/Progress Note: >5/10

## 2024-09-03 ENCOUNTER — HOSPITAL ENCOUNTER (OUTPATIENT)
Facility: HOSPITAL | Age: 64
Setting detail: RECURRING SERIES
Discharge: HOME | End: 2024-09-06
Payer: MEDICARE

## 2024-09-03 PROCEDURE — G0283 ELEC STIM OTHER THAN WOUND: HCPCS

## 2024-09-03 PROCEDURE — 97150 GROUP THERAPEUTIC PROCEDURES: CPT

## 2024-09-03 PROCEDURE — 97110 THERAPEUTIC EXERCISES: CPT

## 2024-09-03 PROCEDURE — 97016 VASOPNEUMATIC DEVICE THERAPY: CPT

## 2024-09-03 NOTE — PROGRESS NOTES
>5/10 pain  Current Status: >5/10 pain  Goal Met? No     Long Term Goals, to be met within 10 treatments:  1.Patient will be able to sit for >60 minutes with less then 2/10 pain to travel for appts.   Status at last Eval/Progress Note: cannot sit long  Current Status: cannot sit long  Goal Met? No     2. Patient will be able to squat to pick objects off the floor without limitations for house and yard work.  Status at last Eval/Progress Note: pain when bending or squating  Current Status: pain when bending or squating  Goal Met? No     3. Patient will improve lumbar extension  to 30 deg to be able to perform workout routines with less limitations. .    Status at last Eval/Progress Note: 10 deg  Current Status: 10 deg  Goal Met? No     []  See Progress Note/Recertification  []  See Discharge Summary    PLAN  [x]  Continue plan of care  [x]  Upgrade activities as tolerated  []  Discharge due to :  []  Other:      Elma Guzman, PT, DPT       9/3/2024       9:50 AM

## 2024-09-05 ENCOUNTER — HOSPITAL ENCOUNTER (OUTPATIENT)
Facility: HOSPITAL | Age: 64
Setting detail: RECURRING SERIES
Discharge: HOME | End: 2024-09-08
Payer: MEDICARE

## 2024-09-05 PROCEDURE — G0283 ELEC STIM OTHER THAN WOUND: HCPCS

## 2024-09-05 PROCEDURE — 97150 GROUP THERAPEUTIC PROCEDURES: CPT

## 2024-09-05 PROCEDURE — 97110 THERAPEUTIC EXERCISES: CPT

## 2024-09-10 ENCOUNTER — HOSPITAL ENCOUNTER (OUTPATIENT)
Facility: HOSPITAL | Age: 64
Setting detail: RECURRING SERIES
Discharge: HOME | End: 2024-09-13
Payer: MEDICARE

## 2024-09-10 PROCEDURE — G0283 ELEC STIM OTHER THAN WOUND: HCPCS

## 2024-09-10 PROCEDURE — 97110 THERAPEUTIC EXERCISES: CPT

## 2024-09-12 ENCOUNTER — HOSPITAL ENCOUNTER (OUTPATIENT)
Facility: HOSPITAL | Age: 64
Setting detail: RECURRING SERIES
Discharge: HOME | End: 2024-09-15
Payer: MEDICARE

## 2024-09-12 ENCOUNTER — OFFICE VISIT (OUTPATIENT)
Facility: CLINIC | Age: 64
End: 2024-09-12

## 2024-09-12 VITALS
OXYGEN SATURATION: 98 % | SYSTOLIC BLOOD PRESSURE: 100 MMHG | TEMPERATURE: 98.1 F | HEART RATE: 78 BPM | HEIGHT: 67 IN | RESPIRATION RATE: 18 BRPM | BODY MASS INDEX: 24.84 KG/M2 | WEIGHT: 158.25 LBS | DIASTOLIC BLOOD PRESSURE: 63 MMHG

## 2024-09-12 DIAGNOSIS — M54.2 NECK PAIN, BILATERAL: ICD-10-CM

## 2024-09-12 DIAGNOSIS — M25.511 CHRONIC RIGHT SHOULDER PAIN: ICD-10-CM

## 2024-09-12 DIAGNOSIS — Z00.00 MEDICARE ANNUAL WELLNESS VISIT, SUBSEQUENT: Primary | ICD-10-CM

## 2024-09-12 DIAGNOSIS — G89.29 CHRONIC RIGHT SHOULDER PAIN: ICD-10-CM

## 2024-09-12 PROCEDURE — 97110 THERAPEUTIC EXERCISES: CPT

## 2024-09-12 PROCEDURE — G0283 ELEC STIM OTHER THAN WOUND: HCPCS

## 2024-09-12 PROCEDURE — 97150 GROUP THERAPEUTIC PROCEDURES: CPT

## 2024-09-12 RX ORDER — ALPRAZOLAM 0.5 MG
0.5 TABLET ORAL DAILY PRN
COMMUNITY
Start: 2024-09-08

## 2024-09-12 RX ORDER — PREDNISONE 20 MG/1
TABLET ORAL
Qty: 9 TABLET | Refills: 0 | Status: SHIPPED | OUTPATIENT
Start: 2024-09-12

## 2024-09-12 RX ORDER — CYCLOBENZAPRINE HCL 5 MG
5 TABLET ORAL NIGHTLY PRN
Qty: 30 TABLET | Refills: 0 | Status: SHIPPED | OUTPATIENT
Start: 2024-09-12

## 2024-09-12 ASSESSMENT — PATIENT HEALTH QUESTIONNAIRE - PHQ9
1. LITTLE INTEREST OR PLEASURE IN DOING THINGS: NOT AT ALL
SUM OF ALL RESPONSES TO PHQ QUESTIONS 1-9: 1
SUM OF ALL RESPONSES TO PHQ QUESTIONS 1-9: 0
5. POOR APPETITE OR OVEREATING: NOT AT ALL
1. LITTLE INTEREST OR PLEASURE IN DOING THINGS: NOT AT ALL
SUM OF ALL RESPONSES TO PHQ9 QUESTIONS 1 & 2: 0
SUM OF ALL RESPONSES TO PHQ QUESTIONS 1-9: 1
SUM OF ALL RESPONSES TO PHQ QUESTIONS 1-9: 1
8. MOVING OR SPEAKING SO SLOWLY THAT OTHER PEOPLE COULD HAVE NOTICED. OR THE OPPOSITE, BEING SO FIGETY OR RESTLESS THAT YOU HAVE BEEN MOVING AROUND A LOT MORE THAN USUAL: NOT AT ALL
9. THOUGHTS THAT YOU WOULD BE BETTER OFF DEAD, OR OF HURTING YOURSELF: NOT AT ALL
SUM OF ALL RESPONSES TO PHQ QUESTIONS 1-9: 0
10. IF YOU CHECKED OFF ANY PROBLEMS, HOW DIFFICULT HAVE THESE PROBLEMS MADE IT FOR YOU TO DO YOUR WORK, TAKE CARE OF THINGS AT HOME, OR GET ALONG WITH OTHER PEOPLE: NOT DIFFICULT AT ALL
SUM OF ALL RESPONSES TO PHQ9 QUESTIONS 1 & 2: 0
2. FEELING DOWN, DEPRESSED OR HOPELESS: NOT AT ALL
6. FEELING BAD ABOUT YOURSELF - OR THAT YOU ARE A FAILURE OR HAVE LET YOURSELF OR YOUR FAMILY DOWN: NOT AT ALL
SUM OF ALL RESPONSES TO PHQ QUESTIONS 1-9: 1
7. TROUBLE CONCENTRATING ON THINGS, SUCH AS READING THE NEWSPAPER OR WATCHING TELEVISION: NOT AT ALL
3. TROUBLE FALLING OR STAYING ASLEEP: NOT AT ALL
SUM OF ALL RESPONSES TO PHQ QUESTIONS 1-9: 0
2. FEELING DOWN, DEPRESSED OR HOPELESS: NOT AT ALL
4. FEELING TIRED OR HAVING LITTLE ENERGY: SEVERAL DAYS
SUM OF ALL RESPONSES TO PHQ QUESTIONS 1-9: 0

## 2024-09-12 ASSESSMENT — LIFESTYLE VARIABLES
HOW OFTEN DO YOU HAVE A DRINK CONTAINING ALCOHOL: NEVER
HOW MANY STANDARD DRINKS CONTAINING ALCOHOL DO YOU HAVE ON A TYPICAL DAY: PATIENT DOES NOT DRINK

## 2024-09-16 ENCOUNTER — HOSPITAL ENCOUNTER (OUTPATIENT)
Facility: HOSPITAL | Age: 64
Setting detail: RECURRING SERIES
Discharge: HOME | End: 2024-09-19
Payer: MEDICARE

## 2024-09-16 PROCEDURE — G0283 ELEC STIM OTHER THAN WOUND: HCPCS

## 2024-09-16 PROCEDURE — 97110 THERAPEUTIC EXERCISES: CPT

## 2024-09-16 PROCEDURE — 97161 PT EVAL LOW COMPLEX 20 MIN: CPT

## 2024-09-18 ENCOUNTER — HOSPITAL ENCOUNTER (OUTPATIENT)
Facility: HOSPITAL | Age: 64
Setting detail: RECURRING SERIES
Discharge: HOME | End: 2024-09-21
Payer: MEDICARE

## 2024-09-18 PROCEDURE — 97110 THERAPEUTIC EXERCISES: CPT

## 2024-09-18 PROCEDURE — G0283 ELEC STIM OTHER THAN WOUND: HCPCS

## 2024-09-23 ENCOUNTER — APPOINTMENT (OUTPATIENT)
Facility: HOSPITAL | Age: 64
End: 2024-09-23
Payer: MEDICARE

## 2024-09-24 ENCOUNTER — HOSPITAL ENCOUNTER (OUTPATIENT)
Facility: HOSPITAL | Age: 64
Setting detail: RECURRING SERIES
Discharge: HOME | End: 2024-09-27
Payer: MEDICARE

## 2024-09-24 PROCEDURE — G0283 ELEC STIM OTHER THAN WOUND: HCPCS

## 2024-09-24 PROCEDURE — 97110 THERAPEUTIC EXERCISES: CPT

## 2024-09-25 ENCOUNTER — APPOINTMENT (OUTPATIENT)
Facility: HOSPITAL | Age: 64
End: 2024-09-25
Payer: MEDICARE

## 2024-09-27 ENCOUNTER — HOSPITAL ENCOUNTER (OUTPATIENT)
Facility: HOSPITAL | Age: 64
Setting detail: RECURRING SERIES
Discharge: HOME | End: 2024-09-30
Payer: MEDICARE

## 2024-09-27 ENCOUNTER — APPOINTMENT (OUTPATIENT)
Facility: HOSPITAL | Age: 64
End: 2024-09-27
Payer: MEDICARE

## 2024-09-27 PROCEDURE — 97110 THERAPEUTIC EXERCISES: CPT

## 2024-09-27 PROCEDURE — 97150 GROUP THERAPEUTIC PROCEDURES: CPT

## 2024-09-27 PROCEDURE — G0283 ELEC STIM OTHER THAN WOUND: HCPCS

## 2024-09-27 NOTE — PROGRESS NOTES
PHYSICAL THERAPY - MEDICARE DAILY TREATMENT NOTE (updated 3/23)    Date of Service: 2024        Patient Name:  Acacia Jarquin :  1960   Medical   Diagnosis:  Neck pain, bilateral [M54.2]  Chronic right shoulder pain [M25.511, G89.29] Treatment Diagnosis:  M25.511  RIGHT SHOULDER PAIN  and M54.2, G89.29  CHRONIC NECK PAIN    Referral Source:  Isabelle Baldwin APRN - C* Insurance:   Payor: [employer] MEDICARE / Plan: [employer] CHOICE-PPO MEDICARE / Product Type: *No Product type* /    [x] Patient's date of birth verified                      Visit #   Current  / Total 4 10   Time   In / Out 8:27 9:37   Total Treatment Time (in minutes) 70    Total Timed Codes (in minutes) 60   1:1 Treatment Time (in minutes) 46       [facility] Totals Reminder:  bill using total billable   min of TIMED therapeutic procedures and modalities.   8-22 min = 1 unit; 23-37 min = 2 units; 38-52 min = 3 units; 53-67 min = 4 units; 68-82 min = 5 units        SUBJECTIVE  Pain Level (0-10 scale): 2/10    Any medication changes, allergies to medications, adverse drug reactions, diagnosis change, or new procedure performed?: No  Medications: [x] Verified on Patient Summary List    Subjective functional status/changes:     \"I am not hurting that bad today. I am doing the exercises at home.\"    OBJECTIVE  Therapeutic Procedures:  Tx Min Billable or 1:1 Min (if diff from Tx Min) Procedure, Rationale, Specifics   46 46 [id number] Therapeutic Exercise (timed):  increase ROM, strength, coordination, balance, and proprioception to improve patient's ability to progress to PLOF and address remaining functional goals. (see flow sheet as applicable)   14  76879 Group Therapy (untimed): Billed while completing therapeutic exercise during end of treatment session to improve patient's ability to progress to PLOF and address remaining functional goals.  (see flow sheet as applicable)              60 46    Total Total   [x] Patient Education billed concurrently with

## 2024-09-30 ENCOUNTER — HOSPITAL ENCOUNTER (OUTPATIENT)
Facility: HOSPITAL | Age: 64
Setting detail: RECURRING SERIES
Discharge: HOME | End: 2024-10-03
Payer: MEDICARE

## 2024-09-30 PROCEDURE — G0283 ELEC STIM OTHER THAN WOUND: HCPCS

## 2024-09-30 PROCEDURE — 97110 THERAPEUTIC EXERCISES: CPT

## 2024-09-30 NOTE — PROGRESS NOTES
bkwd  Lvl 2 x12'  L2  F/B                 Sh rolls  F/B   x20  x20 ea      X 20 Ea    X 20                Cervical AROM   x20  x 10 ea    x20     X 20    X 20                Chin tuck      x20 X 10 w/ AROM     5\"x10     5/10\"     5 sec x 10                UT stretch      30x3  30\"x3 + LS  + LS  30\"x3 ea   B  + LS  3/30\"  Ea.  30 sec x 3               Scap retraction    x20 Bilat ER RTB x 20   Bilat ER   red x20  B  + Ext.  RTB   X 20 Ea.  B  + Ext.  RTB   X 20 Ea              pulleys   Flex / HAB x 3' ea   flex/abd/  Habd  x3'ea F/ABD/Horiz ABD  X 3' Ea.   F/ABD/Horiz ABD  X 3' Ea                                                                                    Modalities to be included future treatment sessions: Electrical Stimulation and Heat Pack  Has HEP been provided to patient? [] No    [x] Yes                                Access Code:  [email or web address]                   Date Provided: 9/16/2024  [] Reviewed HEP    [] Progressed/Changed HEP, details:      GOALS  Short Term Goals, to be met within 5 treatments:  Patient will demonstrate independence and compliance with HEP in order to increase mobility and assist with carryover from PT services.  Status at last Eval/Progress Note: provided  Current Status: provided  Goal Met?  yes     2. Patient will be able to complete household chores without reports of head being too heavy to hold up, to increase functional mobility.    Status at last Eval/Progress Note: limited endurance of cervical musculature  Current Status: limited endurance of cervical musculature  Goal Met? No     3. Patient will be able to turn to check blind spot while driving without difficulty to increase functional mobility.    Status at last Eval/Progress Note: difficulty  Current Status: difficulty  Goal Met? No     Long Term Goals, to be met within 10 treatments:  1. Patient will be able to complete UE exercises at home without difficulty to increase functional mobility.    Status at last

## 2024-10-02 ENCOUNTER — HOSPITAL ENCOUNTER (OUTPATIENT)
Facility: HOSPITAL | Age: 64
Setting detail: RECURRING SERIES
Discharge: HOME | End: 2024-10-05
Payer: MEDICARE

## 2024-10-02 PROCEDURE — 97110 THERAPEUTIC EXERCISES: CPT

## 2024-10-02 PROCEDURE — G0283 ELEC STIM OTHER THAN WOUND: HCPCS

## 2024-10-02 NOTE — PROGRESS NOTES
Other:  []w/US   []w/ice   [x]w/heat  Position: sitting  Location:neck and right shoulder            min []  Mechanical Traction,        []  Cervical      []  Lumbar        []  Prone         []  Supine           []  Intermitt.     []  Cont.     Lbs:    pounds  [] With heat  [] Simultaneously performed with E-Stim    min []  Ultrasound,    []Continuous   [] Pulsed  []1MHz   []3MHz Location:  W/cm2:    min  Unbilled []  Ice     []  Heat             Position:  Location:            min []  Vasopneumatic Device,      pre-treatment girth :  cm   post-treatment girth : cm   measured at (landmark       location) :  Pressure:       [] lo [] med [] hi   Temperature: [] lo [] med [] Hi    [] With ice    [] Simultaneously performed with Estim     Skin assessment post-treatment (if applicable):    [x]  intact    [x]  redness- no adverse reaction []redness - adverse reaction:        Pain Level at end of session (0-10 scale): 0/10    Assessment   Patient tolerated treatment with cervical and shoulder exercises with pulleys. Patient is doing well with no swelling or apprehension of movements . Patient is returning to her prior level of function. Patient will continue to benefit from skilled PT services to analyze and address ROM deficits, analyze and address strength deficits, analyze and address soft tissue restrictions, analyze and cue for proper movement patterns, and analyze and modify for postural abnormalities to address functional deficits and attain remaining goals.  PRECAUTIONS:  None at this time        Date of Initial Evaluation: 9/16/2024  Date of last Progress Note: n/a  Visit # of last Progress Note: 1      Number of Insurance Authorized visits: 10 visits until 11/16/24           Therapeutic Exercise Flow Sheet:                                                                                    Running Visit #    EXERCISE   1   2   3   4   5   6   7   8   9   10   UBE L2 6m  Lvl2 x 10' fwd/bwd  bkwd  Lvl 2 x12'

## 2024-10-07 ENCOUNTER — HOSPITAL ENCOUNTER (OUTPATIENT)
Facility: HOSPITAL | Age: 64
Setting detail: RECURRING SERIES
Discharge: HOME | End: 2024-10-10
Payer: MEDICARE

## 2024-10-07 PROCEDURE — G0283 ELEC STIM OTHER THAN WOUND: HCPCS

## 2024-10-07 PROCEDURE — 97110 THERAPEUTIC EXERCISES: CPT

## 2024-10-07 NOTE — PROGRESS NOTES
[x]w/heat  Position: sitting  Location:B upper traps            min []  Mechanical Traction,        []  Cervical      []  Lumbar        []  Prone         []  Supine           []  Intermitt.     []  Cont.     Lbs:    pounds  [] With heat  [] Simultaneously performed with E-Stim    min []  Ultrasound,    []Continuous   [] Pulsed  []1MHz   []3MHz Location:  W/cm2:    min  Unbilled []  Ice     []  Heat             Position:  Location:            min []  Vasopneumatic Device,      pre-treatment girth :  cm   post-treatment girth : cm   measured at (landmark       location) :  Pressure:       [] lo [] med [] hi   Temperature: [] lo [] med [] Hi    [] With ice    [] Simultaneously performed with Estim     Skin assessment post-treatment (if applicable):    [x]  intact    [x]  redness- no adverse reaction []redness - adverse reaction:        Pain Level at end of session (0-10 scale): 0/10    Assessment   Patient tolerated treatment well today with focus on shoulder strength and cervical mobility. Her main complaint now is stiffness when looking up. Patient will continue to benefit from skilled PT services to analyze and address ROM deficits, analyze and address strength deficits, analyze and address soft tissue restrictions, analyze and cue for proper movement patterns, and analyze and modify for postural abnormalities to address functional deficits and attain remaining goals.  PRECAUTIONS:  None at this time        Date of Initial Evaluation: 9/16/2024  Date of last Progress Note: n/a  Visit # of last Progress Note: 1      Number of Insurance Authorized visits: 10 visits until 11/16/24           Therapeutic Exercise Flow Sheet:                                                                                    Running Visit #    EXERCISE   1   2   3   4   5   6   7   8   9   10   UBE L2 6m  Lvl2 x 10' fwd/bwd  bkwd  Lvl 2 x12'  L2  F/B   Nustep Level 3 x 12'   lvl 3 x12 min           Sh rolls  F/B   x20  x20 ea      X 20 Ea

## 2024-10-09 ENCOUNTER — HOSPITAL ENCOUNTER (OUTPATIENT)
Facility: HOSPITAL | Age: 64
Setting detail: RECURRING SERIES
Discharge: HOME | End: 2024-10-12
Payer: MEDICARE

## 2024-10-09 PROCEDURE — 97110 THERAPEUTIC EXERCISES: CPT

## 2024-10-09 PROCEDURE — G0283 ELEC STIM OTHER THAN WOUND: HCPCS

## 2024-10-09 PROCEDURE — 97150 GROUP THERAPEUTIC PROCEDURES: CPT

## 2024-10-09 NOTE — PROGRESS NOTES
PHYSICAL THERAPY - MEDICARE DAILY TREATMENT NOTE (updated 3/23)    Date of Service: 10/9/2024        Patient Name:  Acacia Jarquin :  1960   Medical   Diagnosis:  Neck pain, bilateral [M54.2]  Chronic right shoulder pain [M25.511, G89.29] Treatment Diagnosis:  M25.511  RIGHT SHOULDER PAIN  and M54.2  NECK PAIN    Referral Source:  Isabelle Baldwin APRN - C* Insurance:   Payor: HU[employer]NA MEDICARE / Plan: HU[employer]NA GOLD PLUS HMO / Product Type: *No Product type* /    [x] Patient's date of birth verified                      Visit #   Current  / Total 8 10   Time   In / Out 1000 am 1111 am   Total Treatment Time (in minutes) 71    Total Timed Codes (in minutes) 59    1:1 Treatment Time (in minutes) 29       [facility] Totals Reminder:  bill using total billable   min of TIMED therapeutic procedures and modalities.   8-22 min = 1 unit; 23-37 min = 2 units; 38-52 min = 3 units; 53-67 min = 4 units; 68-82 min = 5 units        SUBJECTIVE  Pain Level (0-10 scale): 2/10    Any medication changes, allergies to medications, adverse drug reactions, diagnosis change, or new procedure performed?: No  Medications: [x] Verified on Patient Summary List    Subjective functional status/changes:     \"Pt reports she is doing better.\"    OBJECTIVE  Therapeutic Procedures:  Tx Min Billable or 1:1 Min (if diff from Tx Min) Procedure, Rationale, Specifics   29 29 [id number] Therapeutic Exercise (timed):  increase ROM, strength, coordination, balance, and proprioception to improve patient's ability to progress to PLOF and address remaining functional goals. (see flow sheet as applicable)   30 0 87471 Group Therapy (untimed): Billed while completing therapeutic exercise during end of treatment session to improve patient's ability to progress to PLOF and address remaining functional goals.  (see flow sheet as applicable)   59 29    Total Total   [x] Patient Education billed concurrently with other procedures     Modalities Rationale:     decrease pain,

## 2024-10-14 ENCOUNTER — HOSPITAL ENCOUNTER (OUTPATIENT)
Facility: HOSPITAL | Age: 64
Setting detail: RECURRING SERIES
Discharge: HOME | End: 2024-10-17
Payer: MEDICARE

## 2024-10-14 PROCEDURE — 97150 GROUP THERAPEUTIC PROCEDURES: CPT

## 2024-10-14 PROCEDURE — G0283 ELEC STIM OTHER THAN WOUND: HCPCS

## 2024-10-14 PROCEDURE — 97110 THERAPEUTIC EXERCISES: CPT

## 2024-10-14 NOTE — PROGRESS NOTES
PHYSICAL THERAPY - MEDICARE DAILY TREATMENT NOTE (updated 3/23)    Date of Service: 10/14/2024        Patient Name:  Acacia Jarquin :  1960   Medical   Diagnosis:  Neck pain, bilateral [M54.2]  Chronic right shoulder pain [M25.511, G89.29] Treatment Diagnosis:  M25.511  RIGHT SHOULDER PAIN  and M54.2  NECK PAIN    Referral Source:  Isabelle Baldwin APRN - C* Insurance:   Payor: HUMANA MEDICARE / Plan: HU[employer]NA GOLD PLUS HMO / Product Type: *No Product type* /    [x] Patient's date of birth verified                      Visit #   Current  / Total 9 10   Time   In / Out 1030 1130   Total Treatment Time (in minutes) 60    Total Timed Codes (in minutes) 35    1:1 Treatment Time (in minutes) 35       [facility] Totals Reminder:  bill using total billable   min of TIMED therapeutic procedures and modalities.   8-22 min = 1 unit; 23-37 min = 2 units; 38-52 min = 3 units; 53-67 min = 4 units; 68-82 min = 5 units        SUBJECTIVE  Pain Level (0-10 scale): 2/10    Any medication changes, allergies to medications, adverse drug reactions, diagnosis change, or new procedure performed?: No  Medications: [x] Verified on Patient Summary List    Subjective functional status/changes:     \"I think I just have arthritis in my neck and shoulder.\"    OBJECTIVE  Therapeutic Procedures:  Tx Min Billable or 1:1 Min (if diff from Tx Min) Procedure, Rationale, Specifics   35 [phone] [id number] Therapeutic Exercise (timed):  increase ROM, strength, coordination, balance, and proprioception to improve patient's ability to progress to PLOF and address remaining functional goals. (see flow sheet as applicable)   15 0 [id number] Group Therapy (untimed): Billed while completing therapeutic exercise during end of treatment session to improve patient's ability to progress to PLOF and address remaining functional goals.  (see flow sheet as applicable)   50 35    Total Total   [x] Patient Education billed concurrently with other procedures     Modalities Rationale:

## 2024-10-16 ENCOUNTER — HOSPITAL ENCOUNTER (OUTPATIENT)
Facility: HOSPITAL | Age: 64
Setting detail: RECURRING SERIES
Discharge: HOME | End: 2024-10-19
Payer: MEDICARE

## 2024-10-16 PROCEDURE — 97110 THERAPEUTIC EXERCISES: CPT

## 2024-10-16 PROCEDURE — G0283 ELEC STIM OTHER THAN WOUND: HCPCS

## 2024-10-16 PROCEDURE — 97150 GROUP THERAPEUTIC PROCEDURES: CPT

## 2024-10-16 NOTE — THERAPY DISCHARGE
[facility]  [address] [place] [id number]  Ph: [phone]     Fax: [phone]    PHYSICAL THERAPY DISCHARGE SUMMARY  Patient Name: Acacia Jarquin : 1960   Treatment/Medical Diagnosis: Neck pain, bilateral [M54.2]  Chronic right shoulder pain [M25.511, G89.29]   Referral Source: Isabelle Baldwin APRN - C*     Date of Initial Visit: 2024 Attended Visits: 10 Missed Visits: 0     SUMMARY OF TREATMENT/CURRENT STATUS  Patient tolerated treatment well  with updated measurements obtained today. She has good ROM now that is mostly equal bilateral with mild deficit with R rotation comparative to the L. She has full UE strength now as well. She does have some mild pain in the R shoulder at the end of the day and with weather changes which is mostly suggestive of arthritis which she sees MD for RA next week. She was given bands and handout about TENS unit today for continued use at home. She will continue to perform HEP and use heat pack. She was encouraged to contact us with any questions or concerns. She is DC from PT services at this time.     STATUS OF GOALS  Short Term Goals, to be met within 5 treatments:  Patient will demonstrate independence and compliance with HEP in order to increase mobility and assist with carryover from PT services.  Status at last Eval/Progress Note: provided  Current Status: performing  Goal Met?  Yes     2. Patient will be able to complete household chores without reports of head being too heavy to hold up, to increase functional mobility.    Status at last Eval/Progress Note: limited endurance of cervical musculature  Current Status: can perform  Goal Met? Yes     3. Patient will be able to turn to check blind spot while driving without difficulty to increase functional mobility.    Status at last Eval/Progress Note: difficulty  Current Status: can perform without difficulty  Goal Met? Yes     Long Term Goals, to be met within 10

## 2024-10-16 NOTE — PROGRESS NOTES
5/5  1 - Trace                                      C3 Side Flex                   5/5                   5/5  2 - Poor                                        C4 Sh Elev                     5/5                   5/5  3 - Fair                                         C5 Deltoid/Biceps           5/5                     5/5  4 - Good                                      C6 Wrist Ext                   5/5                     5/5  5 - Normal                                    C7 Triceps                      5/5                     5/5  Pain Level at end of session (0-10 scale): 0/10    Assessment   Patient tolerated treatment well  with updated measurements obtained today. She has good ROM now that is mostly equal bilateral with mild deficit with R rotation comparative to the L. She has full UE strength now as well. She does have some mild pain in the R shoulder at the end of the day and with weather changes which is mostly suggestive of arthritis which she sees MD for RA next week. She was given bands and handout about TENS unit today for continued use at home. She will continue to perform HEP and use heat pack. She was encouraged to contact us with any questions or concerns. She is DC from PT services at this time.     Progress toward goals / Updated goals:    PRECAUTIONS:  None at this time        Date of Initial Evaluation: 9/16/2024  Date of last Progress Note: n/a  Visit # of last Progress Note: 1      Number of Insurance Authorized visits: 10 visits until 11/16/24           Therapeutic Exercise Flow Sheet:                                                                                    Running Visit #    EXERCISE   1   2   3   4   5   6   7   8   9   10   UBE L2 6m  Lvl2 x 10' fwd/bwd  bkwd  Lvl 2 x12'  L2  F/B   Nustep Level 3 x 12'   lvl 3 x12 min  lvl 3 x12 min  lvl 3 x12 min         Sh rolls  F/B   x20  x20 ea      X 20 Ea     X 20       X 20 X 20   x20   x20     Cervical AROM   x20  x 10 ea    x20     X

## 2024-12-12 ENCOUNTER — OFFICE VISIT (OUTPATIENT)
Facility: CLINIC | Age: 64
End: 2024-12-12
Payer: MEDICARE

## 2024-12-12 ENCOUNTER — HOSPITAL ENCOUNTER (OUTPATIENT)
Facility: HOSPITAL | Age: 64
Discharge: HOME | End: 2024-12-15
Payer: MEDICARE

## 2024-12-12 VITALS
SYSTOLIC BLOOD PRESSURE: 112 MMHG | BODY MASS INDEX: 25.96 KG/M2 | OXYGEN SATURATION: 99 % | WEIGHT: 165.38 LBS | HEIGHT: 67 IN | RESPIRATION RATE: 20 BRPM | TEMPERATURE: 98 F | DIASTOLIC BLOOD PRESSURE: 67 MMHG | HEART RATE: 92 BPM

## 2024-12-12 DIAGNOSIS — M25.511 CHRONIC RIGHT SHOULDER PAIN: ICD-10-CM

## 2024-12-12 DIAGNOSIS — I10 PRIMARY HYPERTENSION: Primary | ICD-10-CM

## 2024-12-12 DIAGNOSIS — E78.00 PURE HYPERCHOLESTEROLEMIA: ICD-10-CM

## 2024-12-12 DIAGNOSIS — M54.2 NECK PAIN, BILATERAL: ICD-10-CM

## 2024-12-12 DIAGNOSIS — F41.9 ANXIETY AND DEPRESSION: ICD-10-CM

## 2024-12-12 DIAGNOSIS — E55.9 VITAMIN D DEFICIENCY: ICD-10-CM

## 2024-12-12 DIAGNOSIS — M05.79 RHEUMATOID ARTHRITIS INVOLVING MULTIPLE SITES WITH POSITIVE RHEUMATOID FACTOR (HCC): ICD-10-CM

## 2024-12-12 DIAGNOSIS — G89.29 CHRONIC RIGHT SHOULDER PAIN: ICD-10-CM

## 2024-12-12 DIAGNOSIS — E53.8 B12 DEFICIENCY: ICD-10-CM

## 2024-12-12 DIAGNOSIS — F32.A ANXIETY AND DEPRESSION: ICD-10-CM

## 2024-12-12 PROCEDURE — 3017F COLORECTAL CA SCREEN DOC REV: CPT

## 2024-12-12 PROCEDURE — 99214 OFFICE O/P EST MOD 30 MIN: CPT

## 2024-12-12 PROCEDURE — G8427 DOCREV CUR MEDS BY ELIG CLIN: HCPCS

## 2024-12-12 PROCEDURE — 72050 X-RAY EXAM NECK SPINE 4/5VWS: CPT

## 2024-12-12 PROCEDURE — 1123F ACP DISCUSS/DSCN MKR DOCD: CPT

## 2024-12-12 PROCEDURE — 3078F DIAST BP <80 MM HG: CPT

## 2024-12-12 PROCEDURE — 1090F PRES/ABSN URINE INCON ASSESS: CPT

## 2024-12-12 PROCEDURE — 1036F TOBACCO NON-USER: CPT

## 2024-12-12 PROCEDURE — G8419 CALC BMI OUT NRM PARAM NOF/U: HCPCS

## 2024-12-12 PROCEDURE — 73030 X-RAY EXAM OF SHOULDER: CPT

## 2024-12-12 PROCEDURE — 3074F SYST BP LT 130 MM HG: CPT

## 2024-12-12 PROCEDURE — G8400 PT W/DXA NO RESULTS DOC: HCPCS

## 2024-12-12 PROCEDURE — G8484 FLU IMMUNIZE NO ADMIN: HCPCS

## 2024-12-12 RX ORDER — TOPIRAMATE 50 MG/1
50 TABLET, FILM COATED ORAL 2 TIMES DAILY
COMMUNITY
Start: 2024-11-30

## 2024-12-12 SDOH — ECONOMIC STABILITY: INCOME INSECURITY: HOW HARD IS IT FOR YOU TO PAY FOR THE VERY BASICS LIKE FOOD, HOUSING, MEDICAL CARE, AND HEATING?: NOT VERY HARD

## 2024-12-12 SDOH — ECONOMIC STABILITY: INCOME INSECURITY: IN THE LAST 12 MONTHS, WAS THERE A TIME WHEN YOU WERE NOT ABLE TO PAY THE MORTGAGE OR RENT ON TIME?: NO

## 2024-12-12 SDOH — ECONOMIC STABILITY: FOOD INSECURITY: WITHIN THE PAST 12 MONTHS, YOU WORRIED THAT YOUR FOOD WOULD RUN OUT BEFORE YOU GOT MONEY TO BUY MORE.: NEVER TRUE

## 2024-12-12 SDOH — ECONOMIC STABILITY: FOOD INSECURITY: WITHIN THE PAST 12 MONTHS, THE FOOD YOU BOUGHT JUST DIDN'T LAST AND YOU DIDN'T HAVE MONEY TO GET MORE.: NEVER TRUE

## 2024-12-12 SDOH — ECONOMIC STABILITY: TRANSPORTATION INSECURITY
IN THE PAST 12 MONTHS, HAS LACK OF TRANSPORTATION KEPT YOU FROM MEETINGS, WORK, OR FROM GETTING THINGS NEEDED FOR DAILY LIVING?: NO

## 2024-12-12 SDOH — ECONOMIC STABILITY: TRANSPORTATION INSECURITY
IN THE PAST 12 MONTHS, HAS THE LACK OF TRANSPORTATION KEPT YOU FROM MEDICAL APPOINTMENTS OR FROM GETTING MEDICATIONS?: NO

## 2024-12-12 ASSESSMENT — ENCOUNTER SYMPTOMS
RHINORRHEA: 0
EYE PAIN: 0
VOMITING: 0
DIARRHEA: 0
SINUS PAIN: 0
BLOOD IN STOOL: 0
SORE THROAT: 0
CONSTIPATION: 0
BACK PAIN: 0
ABDOMINAL PAIN: 0
NAUSEA: 0

## 2024-12-12 ASSESSMENT — PATIENT HEALTH QUESTIONNAIRE - PHQ9
SUM OF ALL RESPONSES TO PHQ QUESTIONS 1-9: 1
SUM OF ALL RESPONSES TO PHQ9 QUESTIONS 1 & 2: 1
1. LITTLE INTEREST OR PLEASURE IN DOING THINGS: NOT AT ALL
SUM OF ALL RESPONSES TO PHQ QUESTIONS 1-9: 1
SUM OF ALL RESPONSES TO PHQ QUESTIONS 1-9: 1
2. FEELING DOWN, DEPRESSED OR HOPELESS: SEVERAL DAYS
SUM OF ALL RESPONSES TO PHQ QUESTIONS 1-9: 1

## 2024-12-12 NOTE — PROGRESS NOTES
Chief Complaint   Patient presents with    Thyroid Problem    Anxiety    Depression    Neck Pain    Shoulder Pain     Right shoulder pain.     \"Have you been to the ER, urgent care clinic since your last visit?  Hospitalized since your last visit?\"    NO    “Have you seen or consulted any other health care providers outside our system since your last visit?”    NO    /67 (Site: Left Upper Arm, Position: Sitting, Cuff Size: Medium Adult)   Pulse 92   Temp 98 °F (36.7 °C) (Oral)   Resp 20   Ht 1.702 m (5' 7\")   Wt 75 kg (165 lb 6 oz)   SpO2 99%   BMI 25.90 kg/m²

## 2024-12-12 NOTE — PROGRESS NOTES
Acacia Jarquin is a 64 y.o. female who presents to the office today for the following:    Chief Complaint   Patient presents with    Thyroid Problem    Anxiety    Depression    Neck Pain    Shoulder Pain     Right shoulder pain.       Past Medical History:   Diagnosis Date    Anxiety     Hypertension     Rectal bleeding 01/2024    Rheumatoid arthritis (HCC)     Sleep apnea     Spinal stenosis         Past Surgical History:   Procedure Laterality Date    COLONOSCOPY N/A 6/13/2024    COLONOSCOPY biopsy performed by Trent Delgadillo Jr., MD at [facility]    HYSTERECTOMY (CERVIX STATUS UNKNOWN)      TUBAL LIGATION          Family History   Problem Relation Age of Onset    Diabetes Mother     Hypertension Mother     Stroke Mother     Heart Disease Father     High Blood Pressure Other     Diabetes Other         Social History     Tobacco Use    Smoking status: Never    Smokeless tobacco: Never   Vaping Use    Vaping status: Never Used   Substance Use Topics    Alcohol use: Never    Drug use: Never        HPI  Patient here for follow up with [facility] hypertension, hyperlipidemia, obesity, anxiety, depression, bilateral SNHL, rheumatoid arthritis vitamin D deficiency.  Last mammogram 3/7/2024. Hysterectomy. Had last colonoscopy 6/2024 by Elie showed descending polyp, melanosis coli, inflamed grade 2 internal hemorrhoids and external hemorrhoids.  Repeat due in 2-3 years Follows with Dr. Ramirez, [place] [facility] for thyroid nodules. Repeat US on 7/16/24 showed decreased multinodular thyromegaly without suspicous nodules - no repeat imaging required in future. Also follows with [facility].  Last visit was referred to PT for bilateral neck and right shoulder pain. This is some better but she does continue to have pain. Would like XR today. Denies numbness tingling change in bowel bladder or gait.     Chief Complaint   Patient presents with    Thyroid Problem    Anxiety    Depression    Neck Pain

## 2024-12-13 LAB
25(OH)D3+25(OH)D2 SERPL-MCNC: 67.3 NG/ML (ref 30–100)
ALBUMIN SERPL-MCNC: 4.6 G/DL (ref 3.9–4.9)
ALP SERPL-CCNC: 92 IU/L (ref 44–121)
ALT SERPL-CCNC: 15 IU/L (ref 0–32)
AST SERPL-CCNC: 25 IU/L (ref 0–40)
BASOPHILS # BLD AUTO: 0.1 X10E3/UL (ref 0–0.2)
BASOPHILS NFR BLD AUTO: 2 %
BILIRUB SERPL-MCNC: 0.4 MG/DL (ref 0–1.2)
BUN SERPL-MCNC: 17 MG/DL (ref 8–27)
BUN/CREAT SERPL: 18 (ref 12–28)
CALCIUM SERPL-MCNC: 10 MG/DL (ref 8.7–10.3)
CHLORIDE SERPL-SCNC: 106 MMOL/L (ref 96–106)
CHOLEST SERPL-MCNC: 244 MG/DL (ref 100–199)
CO2 SERPL-SCNC: 25 MMOL/L (ref 20–29)
CREAT SERPL-MCNC: 0.93 MG/DL (ref 0.57–1)
EGFRCR SERPLBLD CKD-EPI 2021: 68 ML/MIN/1.73
EOSINOPHIL # BLD AUTO: 0 X10E3/UL (ref 0–0.4)
EOSINOPHIL NFR BLD AUTO: 1 %
ERYTHROCYTE [DISTWIDTH] IN BLOOD BY AUTOMATED COUNT: 12.5 % (ref 11.7–15.4)
FOLATE SERPL-MCNC: >20 NG/ML
GLOBULIN SER CALC-MCNC: 3.2 G/DL (ref 1.5–4.5)
GLUCOSE SERPL-MCNC: 77 MG/DL (ref 70–99)
HCT VFR BLD AUTO: 39.3 % (ref 34–46.6)
HDLC SERPL-MCNC: 106 MG/DL
HGB BLD-MCNC: 12.8 G/DL (ref 11.1–15.9)
IMM GRANULOCYTES # BLD AUTO: 0 X10E3/UL (ref 0–0.1)
IMM GRANULOCYTES NFR BLD AUTO: 0 %
LDLC SERPL CALC-MCNC: 128 MG/DL (ref 0–99)
LYMPHOCYTES # BLD AUTO: 1.4 X10E3/UL (ref 0.7–3.1)
LYMPHOCYTES NFR BLD AUTO: 46 %
MCH RBC QN AUTO: 31.4 PG (ref 26.6–33)
MCHC RBC AUTO-ENTMCNC: 32.6 G/DL (ref 31.5–35.7)
MCV RBC AUTO: 96 FL (ref 79–97)
MONOCYTES # BLD AUTO: 0.4 X10E3/UL (ref 0.1–0.9)
MONOCYTES NFR BLD AUTO: 13 %
NEUTROPHILS # BLD AUTO: 1.2 X10E3/UL (ref 1.4–7)
NEUTROPHILS NFR BLD AUTO: 38 %
PLATELET # BLD AUTO: 189 X10E3/UL (ref 150–450)
POTASSIUM SERPL-SCNC: 4.2 MMOL/L (ref 3.5–5.2)
PROT SERPL-MCNC: 7.8 G/DL (ref 6–8.5)
RBC # BLD AUTO: 4.08 X10E6/UL (ref 3.77–5.28)
SODIUM SERPL-SCNC: 143 MMOL/L (ref 134–144)
T4 FREE SERPL-MCNC: 1.22 NG/DL (ref 0.82–1.77)
TRIGL SERPL-MCNC: 60 MG/DL (ref 0–149)
TSH SERPL DL<=0.005 MIU/L-ACNC: 2.16 UIU/ML (ref 0.45–4.5)
VIT B12 SERPL-MCNC: 1521 PG/ML (ref 232–1245)
VLDLC SERPL CALC-MCNC: 10 MG/DL (ref 5–40)
WBC # BLD AUTO: 3.1 X10E3/UL (ref 3.4–10.8)

## 2024-12-23 ENCOUNTER — TELEPHONE (OUTPATIENT)
Facility: CLINIC | Age: 64
End: 2024-12-23

## 2024-12-23 NOTE — TELEPHONE ENCOUNTER
Pt came in to fu to up on xrays and would like a copy printed once reviewed. Pt is requesting refill on amlodipine-[facility] [place] , pt is also requesting an referral to [facility].

## 2024-12-24 DIAGNOSIS — I10 PRIMARY HYPERTENSION: Primary | ICD-10-CM

## 2024-12-24 RX ORDER — AMLODIPINE BESYLATE 5 MG/1
5 TABLET ORAL DAILY
Qty: 90 TABLET | Refills: 0 | Status: SHIPPED | OUTPATIENT
Start: 2024-12-24

## 2024-12-27 ENCOUNTER — TELEPHONE (OUTPATIENT)
Facility: CLINIC | Age: 64
End: 2024-12-27

## 2024-12-27 NOTE — TELEPHONE ENCOUNTER
Spoke with pt and gave her the information-she understood and is requesting a copy of her xray report

## 2025-02-21 DIAGNOSIS — E78.00 PURE HYPERCHOLESTEROLEMIA: Primary | ICD-10-CM

## 2025-02-21 RX ORDER — ROSUVASTATIN CALCIUM 20 MG/1
20 TABLET, COATED ORAL DAILY
Qty: 30 TABLET | Refills: 2 | Status: SHIPPED | OUTPATIENT
Start: 2025-02-21

## 2025-03-11 ENCOUNTER — TELEPHONE (OUTPATIENT)
Facility: CLINIC | Age: 65
End: 2025-03-11

## 2025-03-11 NOTE — TELEPHONE ENCOUNTER
Attempted to contact patient regarding upcoming Medicare wellness appointment and completion of HRA questionnaire. LVM for patient to please return call at [phone].

## 2025-03-13 ENCOUNTER — OFFICE VISIT (OUTPATIENT)
Facility: CLINIC | Age: 65
End: 2025-03-13

## 2025-03-13 VITALS
SYSTOLIC BLOOD PRESSURE: 130 MMHG | HEIGHT: 67 IN | HEART RATE: 84 BPM | WEIGHT: 163 LBS | DIASTOLIC BLOOD PRESSURE: 68 MMHG | RESPIRATION RATE: 17 BRPM | OXYGEN SATURATION: 99 % | BODY MASS INDEX: 25.58 KG/M2 | TEMPERATURE: 97.9 F

## 2025-03-13 DIAGNOSIS — Z00.00 MEDICARE ANNUAL WELLNESS VISIT, SUBSEQUENT: Primary | ICD-10-CM

## 2025-03-13 DIAGNOSIS — M54.2 NECK PAIN, BILATERAL: ICD-10-CM

## 2025-03-13 DIAGNOSIS — F41.9 ANXIETY AND DEPRESSION: ICD-10-CM

## 2025-03-13 DIAGNOSIS — E78.00 PURE HYPERCHOLESTEROLEMIA: ICD-10-CM

## 2025-03-13 DIAGNOSIS — M05.79 RHEUMATOID ARTHRITIS INVOLVING MULTIPLE SITES WITH POSITIVE RHEUMATOID FACTOR (HCC): ICD-10-CM

## 2025-03-13 DIAGNOSIS — D70.9 NEUTROPENIA, UNSPECIFIED TYPE: ICD-10-CM

## 2025-03-13 DIAGNOSIS — I10 PRIMARY HYPERTENSION: ICD-10-CM

## 2025-03-13 DIAGNOSIS — F32.A ANXIETY AND DEPRESSION: ICD-10-CM

## 2025-03-13 DIAGNOSIS — K59.09 CHRONIC CONSTIPATION: ICD-10-CM

## 2025-03-13 LAB
BASOPHILS # BLD AUTO: 0 X10E3/UL (ref 0–0.2)
BASOPHILS NFR BLD AUTO: 1 %
EOSINOPHIL # BLD AUTO: 0 X10E3/UL (ref 0–0.4)
EOSINOPHIL NFR BLD AUTO: 1 %
ERYTHROCYTE [DISTWIDTH] IN BLOOD BY AUTOMATED COUNT: 13.3 % (ref 11.7–15.4)
HCT VFR BLD AUTO: 36 % (ref 34–46.6)
HGB BLD-MCNC: 11.7 G/DL (ref 11.1–15.9)
IMM GRANULOCYTES # BLD AUTO: 0 X10E3/UL (ref 0–0.1)
IMM GRANULOCYTES NFR BLD AUTO: 1 %
LYMPHOCYTES # BLD AUTO: 1.3 X10E3/UL (ref 0.7–3.1)
LYMPHOCYTES NFR BLD AUTO: 32 %
MCH RBC QN AUTO: 32.1 PG (ref 26.6–33)
MCHC RBC AUTO-ENTMCNC: 32.5 G/DL (ref 31.5–35.7)
MCV RBC AUTO: 99 FL (ref 79–97)
MONOCYTES # BLD AUTO: 0.4 X10E3/UL (ref 0.1–0.9)
MONOCYTES NFR BLD AUTO: 11 %
NEUTROPHILS # BLD AUTO: 2.3 X10E3/UL (ref 1.4–7)
NEUTROPHILS NFR BLD AUTO: 54 %
PLATELET # BLD AUTO: 186 X10E3/UL (ref 150–450)
RBC # BLD AUTO: 3.64 X10E6/UL (ref 3.77–5.28)
WBC # BLD AUTO: 4.1 X10E3/UL (ref 3.4–10.8)

## 2025-03-13 RX ORDER — CYCLOBENZAPRINE HCL 5 MG
5 TABLET ORAL NIGHTLY PRN
Qty: 30 TABLET | Refills: 2 | Status: SHIPPED | OUTPATIENT
Start: 2025-03-13

## 2025-03-13 RX ORDER — MELOXICAM 15 MG/1
15 TABLET ORAL DAILY PRN
Qty: 30 TABLET | Refills: 2 | Status: SHIPPED | OUTPATIENT
Start: 2025-03-13

## 2025-03-13 SDOH — ECONOMIC STABILITY: FOOD INSECURITY: WITHIN THE PAST 12 MONTHS, THE FOOD YOU BOUGHT JUST DIDN'T LAST AND YOU DIDN'T HAVE MONEY TO GET MORE.: NEVER TRUE

## 2025-03-13 SDOH — ECONOMIC STABILITY: FOOD INSECURITY: WITHIN THE PAST 12 MONTHS, YOU WORRIED THAT YOUR FOOD WOULD RUN OUT BEFORE YOU GOT MONEY TO BUY MORE.: NEVER TRUE

## 2025-03-13 ASSESSMENT — PATIENT HEALTH QUESTIONNAIRE - PHQ9
10. IF YOU CHECKED OFF ANY PROBLEMS, HOW DIFFICULT HAVE THESE PROBLEMS MADE IT FOR YOU TO DO YOUR WORK, TAKE CARE OF THINGS AT HOME, OR GET ALONG WITH OTHER PEOPLE: NOT DIFFICULT AT ALL
4. FEELING TIRED OR HAVING LITTLE ENERGY: NOT AT ALL
SUM OF ALL RESPONSES TO PHQ QUESTIONS 1-9: 2
7. TROUBLE CONCENTRATING ON THINGS, SUCH AS READING THE NEWSPAPER OR WATCHING TELEVISION: NOT AT ALL
9. THOUGHTS THAT YOU WOULD BE BETTER OFF DEAD, OR OF HURTING YOURSELF: NOT AT ALL
3. TROUBLE FALLING OR STAYING ASLEEP: NOT AT ALL
SUM OF ALL RESPONSES TO PHQ QUESTIONS 1-9: 2
2. FEELING DOWN, DEPRESSED OR HOPELESS: SEVERAL DAYS
SUM OF ALL RESPONSES TO PHQ QUESTIONS 1-9: 2
1. LITTLE INTEREST OR PLEASURE IN DOING THINGS: SEVERAL DAYS
6. FEELING BAD ABOUT YOURSELF - OR THAT YOU ARE A FAILURE OR HAVE LET YOURSELF OR YOUR FAMILY DOWN: NOT AT ALL
SUM OF ALL RESPONSES TO PHQ QUESTIONS 1-9: 2
8. MOVING OR SPEAKING SO SLOWLY THAT OTHER PEOPLE COULD HAVE NOTICED. OR THE OPPOSITE, BEING SO FIGETY OR RESTLESS THAT YOU HAVE BEEN MOVING AROUND A LOT MORE THAN USUAL: NOT AT ALL
5. POOR APPETITE OR OVEREATING: NOT AT ALL

## 2025-03-13 NOTE — PROGRESS NOTES
Medicare Annual Wellness Visit    Acacia Jarquin is here for Medicare AWV and Hypertension    Assessment & Plan   Medicare annual wellness visit, subsequent    Rheumatoid arthritis involving multiple sites with positive rheumatoid factor (HCC)  -     meloxicam (MOBIC) 15 MG tablet; Take 1 tablet by mouth daily as needed for Pain, Disp-30 tablet, R-2Normal  Not at goal.   Suspect due to out of Enbrel. She sees [facility] next week for follow up. States she believes her PA is now approved for .  Resume Enbrel.  Continue Methotrexate as directed.  May use Meloxicam prn pain as directed. Take with food to reduce GI upset.    Anxiety and depression        This is stable. PHQ9 score of 0 and GAD7 score of 0.        Continue Xanax prn as directed.         Do not take and drive or operate machinery as Xanax may cause drowsiness vs impairment.         Denies SI HI and AVH.         If SI HI or AVH contact provider immediately or seek ER care.        Discussed adjuvant therapy to include meditation, massage, and deep breathing exercises.        Following with [facility]erie at [facility].        Contact provider if new or worsening symptoms.     Primary hypertension  Blood pressure is at goal of < 140/90.  Continue amlodipine as directed.   HCTZ was stopped last year due to hypotension and dizziness.  Encouraged home monitoring of BP and provider notification if > 140/90 consistently.   Low sodium diet.    Pure hypercholesterolemia  -     Lipid Panel  Lab Results   Component Value Date    CHOL 244 (H) 12/12/2024    CHOL 201 (H) 05/30/2024    CHOL 230 (H) 11/28/2023     Lab Results   Component Value Date    TRIG 60 12/12/2024    TRIG 55 05/30/2024    TRIG 89 11/28/2023     Lab Results   Component Value Date     12/12/2024    HDL 85 05/30/2024     11/28/2023     Lab Results   Component Value Date     (H) 12/12/2024     (H) 05/30/2024     (H) 11/28/2023     Lab Results   Component Value Date    VLDL 10

## 2025-03-13 NOTE — PROGRESS NOTES
Chief Complaint   Patient presents with    Medicare AWV    Hypertension     Wellness    Pt did not bring meds, went over list, pt confirmed       \"Have you been to the ER, urgent care clinic since your last visit?  Hospitalized since your last visit?\"    NO    “Have you seen or consulted any other health care providers outside our system since your last visit?”    NO

## 2025-03-13 NOTE — PATIENT INSTRUCTIONS
information, including forms for your state, see the [employer] website (www.caringinfo.org/planning/advance-directives/).  Follow-up care is a key part of your treatment and safety. Be sure to make and go to all appointments, and call your doctor if you are having problems. It's also a good idea to know your test results and keep a list of the medicines you take.  What should you include in an advance directive?  Many states have a unique advance directive form. (It may ask you to address specific issues.) Or you might use a universal form that's approved by many states.  If your form doesn't tell you what to address, it may be hard to know what to include in your advance directive. Use the questions below to help you get started.  Who do you want to make decisions about your medical care if you are not able to?  What life-support measures do you want if you have a serious illness that gets worse over time or can't be cured?  What are you most afraid of that might happen? (Maybe you're afraid of having pain, losing your independence, or being kept alive by machines.)  Where would you prefer to die? (Your home? A hospital? A nursing home?)  Do you want to donate your organs when you die?  Do you want certain Sabian[employer] practices performed before you die?  When should you call for help?  Be sure to contact your doctor if you have any questions.  Where can you learn more?  Go to https://www.[employer].net/patientEd and enter R264 to learn more about \"Advance Directives: Care Instructions.\"  Current as of: March 1, 2024  Content Version: 14.4  © 4181-5893 [employer].   Care instructions adapted under license by [employer]. If you have questions about a medical condition or this instruction, always ask your healthcare professional. [employer] disclaims any warranty or liability for your use of this information.         A Healthy Heart: Care Instructions  Overview     Coronary artery disease,

## 2025-03-14 ENCOUNTER — RESULTS FOLLOW-UP (OUTPATIENT)
Facility: CLINIC | Age: 65
End: 2025-03-14

## 2025-03-14 LAB
CHOLEST SERPL-MCNC: 174 MG/DL (ref 100–199)
HDLC SERPL-MCNC: 100 MG/DL
LDLC SERPL CALC-MCNC: 63 MG/DL (ref 0–99)
TRIGL SERPL-MCNC: 54 MG/DL (ref 0–149)
VLDLC SERPL CALC-MCNC: 11 MG/DL (ref 5–40)

## 2025-03-21 ENCOUNTER — TELEPHONE (OUTPATIENT)
Facility: CLINIC | Age: 65
End: 2025-03-21

## 2025-03-21 DIAGNOSIS — K59.09 CHRONIC CONSTIPATION: ICD-10-CM

## 2025-03-21 NOTE — TELEPHONE ENCOUNTER
Patient called in and stated [facility] will be faxing over a medication request for the medication, Linzess. She stated the medication @ [facility] is $390.00 vs [facility] it's only $40.00.

## 2025-03-22 DIAGNOSIS — I10 PRIMARY HYPERTENSION: ICD-10-CM

## 2025-03-23 RX ORDER — AMLODIPINE BESYLATE 5 MG/1
5 TABLET ORAL DAILY
Qty: 90 TABLET | Refills: 0 | Status: SHIPPED | OUTPATIENT
Start: 2025-03-23

## 2025-04-22 ENCOUNTER — TRANSCRIBE ORDERS (OUTPATIENT)
Facility: HOSPITAL | Age: 65
End: 2025-04-22

## 2025-04-22 DIAGNOSIS — Z12.31 OTHER SCREENING MAMMOGRAM: Primary | ICD-10-CM

## 2025-05-14 ENCOUNTER — HOSPITAL ENCOUNTER (OUTPATIENT)
Facility: HOSPITAL | Age: 65
Discharge: HOME | End: 2025-05-17
Payer: MEDICARE

## 2025-05-14 ENCOUNTER — RESULTS FOLLOW-UP (OUTPATIENT)
Facility: CLINIC | Age: 65
End: 2025-05-14

## 2025-05-14 DIAGNOSIS — Z12.31 OTHER SCREENING MAMMOGRAM: ICD-10-CM

## 2025-05-14 PROCEDURE — 77063 BREAST TOMOSYNTHESIS BI: CPT

## 2025-05-19 DIAGNOSIS — E78.00 PURE HYPERCHOLESTEROLEMIA: ICD-10-CM

## 2025-05-19 RX ORDER — ROSUVASTATIN CALCIUM 20 MG/1
20 TABLET, COATED ORAL DAILY
Qty: 90 TABLET | Refills: 1 | Status: SHIPPED | OUTPATIENT
Start: 2025-05-19

## 2025-06-16 ENCOUNTER — OFFICE VISIT (OUTPATIENT)
Facility: CLINIC | Age: 65
End: 2025-06-16
Payer: MEDICARE

## 2025-06-16 VITALS
HEART RATE: 78 BPM | SYSTOLIC BLOOD PRESSURE: 129 MMHG | WEIGHT: 158 LBS | TEMPERATURE: 97.6 F | RESPIRATION RATE: 20 BRPM | BODY MASS INDEX: 24.8 KG/M2 | OXYGEN SATURATION: 97 % | HEIGHT: 67 IN | DIASTOLIC BLOOD PRESSURE: 74 MMHG

## 2025-06-16 DIAGNOSIS — M05.79 RHEUMATOID ARTHRITIS INVOLVING MULTIPLE SITES WITH POSITIVE RHEUMATOID FACTOR (HCC): ICD-10-CM

## 2025-06-16 DIAGNOSIS — F41.9 ANXIETY AND DEPRESSION: ICD-10-CM

## 2025-06-16 DIAGNOSIS — K59.09 CHRONIC CONSTIPATION: ICD-10-CM

## 2025-06-16 DIAGNOSIS — E53.8 B12 DEFICIENCY: ICD-10-CM

## 2025-06-16 DIAGNOSIS — I10 PRIMARY HYPERTENSION: Primary | ICD-10-CM

## 2025-06-16 DIAGNOSIS — M54.2 NECK PAIN, BILATERAL: ICD-10-CM

## 2025-06-16 DIAGNOSIS — E55.9 VITAMIN D DEFICIENCY: ICD-10-CM

## 2025-06-16 DIAGNOSIS — E78.00 PURE HYPERCHOLESTEROLEMIA: ICD-10-CM

## 2025-06-16 DIAGNOSIS — F32.A ANXIETY AND DEPRESSION: ICD-10-CM

## 2025-06-16 PROBLEM — E66.3 OVERWEIGHT (BMI 25.0-29.9): Status: RESOLVED | Noted: 2024-02-29 | Resolved: 2025-06-16

## 2025-06-16 PROBLEM — K62.5 RECTAL BLEEDING: Status: RESOLVED | Noted: 2024-03-19 | Resolved: 2025-06-16

## 2025-06-16 PROCEDURE — G8420 CALC BMI NORM PARAMETERS: HCPCS

## 2025-06-16 PROCEDURE — G8427 DOCREV CUR MEDS BY ELIG CLIN: HCPCS

## 2025-06-16 PROCEDURE — G8400 PT W/DXA NO RESULTS DOC: HCPCS

## 2025-06-16 PROCEDURE — 3078F DIAST BP <80 MM HG: CPT

## 2025-06-16 PROCEDURE — 3017F COLORECTAL CA SCREEN DOC REV: CPT

## 2025-06-16 PROCEDURE — 99214 OFFICE O/P EST MOD 30 MIN: CPT

## 2025-06-16 PROCEDURE — 1159F MED LIST DOCD IN RCRD: CPT

## 2025-06-16 PROCEDURE — 3074F SYST BP LT 130 MM HG: CPT

## 2025-06-16 PROCEDURE — 1090F PRES/ABSN URINE INCON ASSESS: CPT

## 2025-06-16 PROCEDURE — 1160F RVW MEDS BY RX/DR IN RCRD: CPT

## 2025-06-16 PROCEDURE — 1123F ACP DISCUSS/DSCN MKR DOCD: CPT

## 2025-06-16 PROCEDURE — 1036F TOBACCO NON-USER: CPT

## 2025-06-16 RX ORDER — ALPRAZOLAM 0.5 MG
0.5 TABLET ORAL DAILY PRN
Qty: 30 TABLET | Refills: 1 | Status: SHIPPED | OUTPATIENT
Start: 2025-06-16 | End: 2025-08-15

## 2025-06-16 RX ORDER — MELOXICAM 15 MG/1
15 TABLET ORAL DAILY PRN
Qty: 30 TABLET | Refills: 2 | Status: SHIPPED | OUTPATIENT
Start: 2025-06-16

## 2025-06-16 RX ORDER — AMLODIPINE BESYLATE 5 MG/1
5 TABLET ORAL DAILY
Qty: 90 TABLET | Refills: 1 | Status: SHIPPED | OUTPATIENT
Start: 2025-06-16

## 2025-06-16 RX ORDER — FOLIC ACID 1 MG/1
1000 TABLET ORAL DAILY
Qty: 90 TABLET | Refills: 1 | Status: SHIPPED | OUTPATIENT
Start: 2025-06-16

## 2025-06-16 ASSESSMENT — ENCOUNTER SYMPTOMS
BLOOD IN STOOL: 0
SORE THROAT: 0
CONSTIPATION: 0
EYE PAIN: 0
NAUSEA: 0
VOMITING: 0
BACK PAIN: 0
ABDOMINAL PAIN: 0
RHINORRHEA: 0
DIARRHEA: 0
SINUS PAIN: 0

## 2025-06-16 NOTE — PROGRESS NOTES
Chief Complaint   Patient presents with    Hypertension     Follow up     1. Have you been to the ER, urgent care clinic since your last visit?  Hospitalized since your last visit?No    2. Have you seen or consulted any other health care providers outside of the [facility] since your last visit?  Include any pap smears or colon screening. No

## 2025-06-16 NOTE — PROGRESS NOTES
Acacia Jarquin is a 65 y.o. female who presents to the office today for the following:    Chief Complaint   Patient presents with    Hypertension       Past Medical History:   Diagnosis Date    Anxiety     Anxiety and depression 11/28/2023    B12 deficiency 06/16/2025    Binge eating 12/22/2022    Chronic constipation 06/16/2025    History of hysterectomy 12/01/2022    Hypertension     Neck pain, bilateral 06/16/2025    Overweight (BMI 25.0-29.9) 02/29/2024    Primary hypertension 06/16/2025    Pure hypercholesterolemia 06/16/2025    Rectal bleeding 01/2024    Rheumatoid arthritis (HCC)     Rheumatoid arthritis involving multiple sites with positive rheumatoid factor (HCC) 11/28/2023    Sleep apnea     Spinal stenosis     Thyroid nodule 11/28/2023    Vitamin D deficiency 06/16/2025        Past Surgical History:   Procedure Laterality Date    COLONOSCOPY N/A 6/13/2024    COLONOSCOPY biopsy performed by Trent Delgadillo Jr., MD at [facility]    HYSTERECTOMY (CERVIX STATUS UNKNOWN)      TUBAL LIGATION          Family History   Problem Relation Age of Onset    Diabetes Mother     Hypertension Mother     Stroke Mother     Heart Disease Father     High Blood Pressure Other     Diabetes Other         Social History     Tobacco Use    Smoking status: Never    Smokeless tobacco: Never   Vaping Use    Vaping status: Never Used   Substance Use Topics    Alcohol use: Never    Drug use: Never        HPI  Patient here for follow up of chronic conditions with PMH anxiety, depression, b12 deficiency, constipation, neck pain, HTN, hyperlipidemia, RA, JESS, thyroid nodule and vit d deficiency. Follows with [facility], Valerie, Psych and Ortho. Last colonoscopy 6/2024 with polyps - repeat 2-3 years. Hysterectomy. Mammogram 5/2025 WNL. DEXA due. Needs med refills. No complaints today.    Chief Complaint   Patient presents with    Hypertension       Current Outpatient Medications on File Prior to Visit   Medication Sig Dispense Refill

## (undated) DEVICE — FORCEPS BX L240CM JAW DIA2.4MM ORNG L CAP W/ NDL DISP RAD

## (undated) DEVICE — JELLY,LUBE,STERILE,FLIP TOP,TUBE,4-OZ: Brand: MEDLINE

## (undated) DEVICE — SPONGE GZ W4XL4IN COT 12 PLY TYP VII WVN C FLD DSGN STERILE

## (undated) DEVICE — 1200CC GUARDIAN II: Brand: GUARDIAN

## (undated) DEVICE — LINE SAMPLING ADVANCE ORAL NASAL MICROSTREAM O2 TUBING 6.5'

## (undated) DEVICE — TUBING, SUCTION, 9/32" X 10', STRAIGHT: Brand: MEDLINE

## (undated) DEVICE — GLOVE ORANGE PI 7 1/2   MSG9075

## (undated) DEVICE — PAD,PREPPING,CUFFED,24X48,7",NONSTERILE: Brand: MEDLINE

## (undated) DEVICE — SOLUTION IRRIG 1000ML STRL H2O USP PLAS POUR BTL